# Patient Record
Sex: FEMALE | Race: WHITE
[De-identification: names, ages, dates, MRNs, and addresses within clinical notes are randomized per-mention and may not be internally consistent; named-entity substitution may affect disease eponyms.]

---

## 2017-12-09 ENCOUNTER — HOSPITAL ENCOUNTER (EMERGENCY)
Dept: HOSPITAL 46 - ED | Age: 67
Discharge: HOME | End: 2017-12-09
Payer: MEDICARE

## 2017-12-09 VITALS — BODY MASS INDEX: 24.99 KG/M2 | HEIGHT: 65 IN | WEIGHT: 150 LBS
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
## 2017-12-09 DIAGNOSIS — Z88.0: ICD-10-CM

## 2017-12-09 DIAGNOSIS — S01.01XA: Primary | ICD-10-CM

## 2017-12-09 DIAGNOSIS — Z90.710: ICD-10-CM

## 2017-12-09 DIAGNOSIS — Z23: ICD-10-CM

## 2017-12-09 DIAGNOSIS — Z79.899: ICD-10-CM

## 2017-12-09 PROCEDURE — 0HQ0XZZ REPAIR SCALP SKIN, EXTERNAL APPROACH: ICD-10-PCS

## 2018-08-13 ENCOUNTER — HOSPITAL ENCOUNTER (EMERGENCY)
Dept: HOSPITAL 46 - ED | Age: 68
Discharge: HOME | End: 2018-08-13
Payer: MEDICARE

## 2018-08-13 VITALS — HEIGHT: 65 IN | BODY MASS INDEX: 26.66 KG/M2 | WEIGHT: 159.99 LBS

## 2018-08-13 DIAGNOSIS — R51: ICD-10-CM

## 2018-08-13 DIAGNOSIS — Z88.0: ICD-10-CM

## 2018-08-13 DIAGNOSIS — R10.9: Primary | ICD-10-CM

## 2018-08-13 DIAGNOSIS — R11.2: ICD-10-CM

## 2018-08-13 DIAGNOSIS — Z79.899: ICD-10-CM

## 2018-10-01 ENCOUNTER — APPOINTMENT (RX ONLY)
Dept: URBAN - METROPOLITAN AREA CLINIC 34 | Facility: CLINIC | Age: 68
Setting detail: DERMATOLOGY
End: 2018-10-01

## 2018-10-01 VITALS
WEIGHT: 168 LBS | SYSTOLIC BLOOD PRESSURE: 119 MMHG | HEIGHT: 65 IN | HEART RATE: 64 BPM | DIASTOLIC BLOOD PRESSURE: 51 MMHG

## 2018-10-01 DIAGNOSIS — L64.8 OTHER ANDROGENIC ALOPECIA: ICD-10-CM

## 2018-10-01 PROCEDURE — ? VENIPUNCTURE

## 2018-10-01 PROCEDURE — ? COUNSELING

## 2018-10-01 PROCEDURE — ? PLAN FOR BMI MANAGEMENT

## 2018-10-01 PROCEDURE — ? ORDER TESTS

## 2018-10-01 PROCEDURE — 99213 OFFICE O/P EST LOW 20 MIN: CPT | Mod: 25

## 2018-10-01 PROCEDURE — 36415 COLL VENOUS BLD VENIPUNCTURE: CPT

## 2018-10-01 ASSESSMENT — LOCATION DETAILED DESCRIPTION DERM
LOCATION DETAILED: MID-OCCIPITAL SCALP
LOCATION DETAILED: RIGHT CENTRAL FRONTAL SCALP
LOCATION DETAILED: LEFT LATERAL FOREHEAD

## 2018-10-01 ASSESSMENT — LOCATION SIMPLE DESCRIPTION DERM
LOCATION SIMPLE: SCALP
LOCATION SIMPLE: LEFT FOREHEAD
LOCATION SIMPLE: POSTERIOR SCALP

## 2018-10-01 ASSESSMENT — LOCATION ZONE DERM
LOCATION ZONE: SCALP
LOCATION ZONE: FACE

## 2018-10-01 NOTE — PROCEDURE: VENIPUNCTURE
Venipuncture Paragraph: An alcohol pad was applied to the venipuncture site. Venipuncture was performed with a needle. Pressure and a bandaid was applied to the site. No complications were noted.
Bill For Individual Tests Below?: no
Detail Level: Detailed
Number Of Tubes Drawn: 1

## 2018-10-01 NOTE — PROCEDURE: COUNSELING
Detail Level: Detailed
Patient Specific Counseling (Will Not Stick From Patient To Patient): She has a hard time with the use of the minoxidil. The hair becomes to oily and matts the hair down. She is interested in PRP of the scalp. A handout was given to her and Tyshawn talked to her about starting treatment.\\n\\nShe will need her Basic metabolic panel drawn today and if all labs are stable I will refill the prescription for spironolactone.

## 2018-10-04 ENCOUNTER — RX ONLY (OUTPATIENT)
Age: 68
Setting detail: RX ONLY
End: 2018-10-04

## 2018-10-04 RX ORDER — SPIRONOLACTONE 100 MG/1
1 TABLET, FILM COATED ORAL DAILY
Qty: 30 | Refills: 11 | Status: ERX

## 2018-10-08 ENCOUNTER — APPOINTMENT (RX ONLY)
Dept: URBAN - METROPOLITAN AREA CLINIC 34 | Facility: CLINIC | Age: 68
Setting detail: DERMATOLOGY
End: 2018-10-08

## 2018-10-08 DIAGNOSIS — L65.9 NONSCARRING HAIR LOSS, UNSPECIFIED: ICD-10-CM

## 2018-10-08 DIAGNOSIS — Z41.9 ENCOUNTER FOR PROCEDURE FOR PURPOSES OTHER THAN REMEDYING HEALTH STATE, UNSPECIFIED: ICD-10-CM

## 2018-10-08 PROCEDURE — ? OTHER (COSMETIC)

## 2018-10-08 PROCEDURE — ? COSMETIC CONSULTATION: SKIN PEN

## 2018-10-08 PROCEDURE — ? ADDITIONAL NOTES

## 2018-10-08 ASSESSMENT — LOCATION DETAILED DESCRIPTION DERM
LOCATION DETAILED: INFERIOR MID FOREHEAD
LOCATION DETAILED: LEFT CENTRAL MALAR CHEEK
LOCATION DETAILED: MEDIAL FRONTAL SCALP

## 2018-10-08 ASSESSMENT — LOCATION SIMPLE DESCRIPTION DERM
LOCATION SIMPLE: INFERIOR FOREHEAD
LOCATION SIMPLE: FRONTAL SCALP
LOCATION SIMPLE: LEFT CHEEK

## 2018-10-08 ASSESSMENT — LOCATION ZONE DERM
LOCATION ZONE: SCALP
LOCATION ZONE: FACE

## 2018-10-16 ENCOUNTER — APPOINTMENT (RX ONLY)
Dept: URBAN - METROPOLITAN AREA CLINIC 34 | Facility: CLINIC | Age: 68
Setting detail: DERMATOLOGY
End: 2018-10-16

## 2018-10-16 DIAGNOSIS — L65.9 NONSCARRING HAIR LOSS, UNSPECIFIED: ICD-10-CM

## 2018-10-16 PROCEDURE — ? COUNSELING

## 2018-10-16 NOTE — HPI: HAIR LOSS
Previous Labs: Yes
How Did The Hair Loss Occur?: sudden in onset
How Severe Is Your Hair Loss?: severe
What Hair Products Do You Use?: Red[place]

## 2018-10-31 ENCOUNTER — APPOINTMENT (RX ONLY)
Dept: URBAN - METROPOLITAN AREA CLINIC 34 | Facility: CLINIC | Age: 68
Setting detail: DERMATOLOGY
End: 2018-10-31

## 2018-10-31 DIAGNOSIS — L65.9 NONSCARRING HAIR LOSS, UNSPECIFIED: ICD-10-CM

## 2018-10-31 DIAGNOSIS — L57.8 OTHER SKIN CHANGES DUE TO CHRONIC EXPOSURE TO NONIONIZING RADIATION: ICD-10-CM

## 2018-10-31 PROCEDURE — ? ADDITIONAL NOTES

## 2018-10-31 PROCEDURE — ? PLATELET RICH PLASMA INJECTION

## 2018-10-31 PROCEDURE — ? SKINPEN

## 2018-10-31 ASSESSMENT — LOCATION DETAILED DESCRIPTION DERM
LOCATION DETAILED: RIGHT SUPERIOR CENTRAL MALAR CHEEK
LOCATION DETAILED: RIGHT LOWER CUTANEOUS LIP
LOCATION DETAILED: RIGHT SUPERIOR MEDIAL FOREHEAD
LOCATION DETAILED: NASAL DORSUM
LOCATION DETAILED: RIGHT MEDIAL FRONTAL SCALP
LOCATION DETAILED: RIGHT FOREHEAD
LOCATION DETAILED: LEFT INFERIOR TEMPLE
LOCATION DETAILED: RIGHT INFERIOR FOREHEAD
LOCATION DETAILED: LEFT CENTRAL PARIETAL SCALP
LOCATION DETAILED: SUPERIOR MID FOREHEAD
LOCATION DETAILED: RIGHT INFERIOR MEDIAL MALAR CHEEK
LOCATION DETAILED: RIGHT CENTRAL FRONTAL SCALP
LOCATION DETAILED: LEFT MEDIAL MALAR CHEEK
LOCATION DETAILED: RIGHT CENTRAL PARIETAL SCALP
LOCATION DETAILED: RIGHT CENTRAL MALAR CHEEK
LOCATION DETAILED: PHILTRUM
LOCATION DETAILED: LEFT CENTRAL MALAR CHEEK
LOCATION DETAILED: RIGHT INFERIOR TEMPLE
LOCATION DETAILED: RIGHT INFERIOR MEDIAL FOREHEAD
LOCATION DETAILED: LEFT CENTRAL EYEBROW
LOCATION DETAILED: GLABELLA
LOCATION DETAILED: RIGHT SUPERIOR FOREHEAD
LOCATION DETAILED: LEFT INFERIOR CENTRAL BUCCAL CHEEK
LOCATION DETAILED: SUPERIOR MID FOREHEAD
LOCATION DETAILED: LEFT SUPERIOR CENTRAL MALAR CHEEK
LOCATION DETAILED: RIGHT SUPERIOR PARIETAL SCALP
LOCATION DETAILED: LEFT MEDIAL FRONTAL SCALP
LOCATION DETAILED: LEFT SUPERIOR FOREHEAD
LOCATION DETAILED: LEFT SUPERIOR PARIETAL SCALP
LOCATION DETAILED: LEFT UPPER CUTANEOUS LIP
LOCATION DETAILED: RIGHT CENTRAL EYEBROW
LOCATION DETAILED: LEFT INFERIOR LATERAL FOREHEAD

## 2018-10-31 ASSESSMENT — LOCATION SIMPLE DESCRIPTION DERM
LOCATION SIMPLE: SUPERIOR FOREHEAD
LOCATION SIMPLE: NOSE
LOCATION SIMPLE: LEFT FOREHEAD
LOCATION SIMPLE: RIGHT FOREHEAD
LOCATION SIMPLE: RIGHT LIP
LOCATION SIMPLE: GLABELLA
LOCATION SIMPLE: RIGHT EYEBROW
LOCATION SIMPLE: LEFT LIP
LOCATION SIMPLE: SCALP
LOCATION SIMPLE: LEFT CHEEK
LOCATION SIMPLE: UPPER LIP
LOCATION SIMPLE: LEFT TEMPLE
LOCATION SIMPLE: RIGHT TEMPLE
LOCATION SIMPLE: RIGHT SCALP
LOCATION SIMPLE: LEFT SCALP
LOCATION SIMPLE: RIGHT CHEEK
LOCATION SIMPLE: RIGHT FOREHEAD
LOCATION SIMPLE: LEFT FOREHEAD
LOCATION SIMPLE: LEFT EYEBROW
LOCATION SIMPLE: SUPERIOR FOREHEAD

## 2018-10-31 ASSESSMENT — LOCATION ZONE DERM
LOCATION ZONE: NOSE
LOCATION ZONE: LIP
LOCATION ZONE: FACE
LOCATION ZONE: FACE
LOCATION ZONE: SCALP

## 2018-10-31 NOTE — PROCEDURE: PLATELET RICH PLASMA INJECTION
Depth In Mm (Will Not Render If 0): 0.2
Depth In Mm (Will Not Render If 0): 0
Consent: Written consent obtained, risks reviewed including but not limited to pain, scarring, infection and incomplete improvement.  Patient understands the procedure is cosmetic in nature and will require out of pocket payment.
Which Technique?: Default
Detail Level: Zone
Show Additional Techniques: Yes
Treatment Number (Optional): 1
Location #2: See diagraim for injection sites/7cc total injected
Post-Care Instructions: After the procedure, take precautions agains sun exposure. Do not apply sunscreen for 12 hours after the procedure. Do not apply make-up for 12 hours after the procedure. Avoid alcohol based toners for 10-14 days. After 2-3 days patients can return to their regular skin regimen.
Consent: Written consent obtained, risks reviewed including but not limited to pain, scarring, infection and no improvement.  Patient understands the procedure is cosmetic in nature and will require out of pocket payment.
Standard Default Technique For Making Prp: The PRP tubes were placed into the centrifuge and spun down for 10 minutes. Then using an 18 gauge needle, the PRP was withdrawn into 3cc syringes. A 27 gauge needle was used for injection.
Post-Care Instructions: After the procedure, Do not wash hair for 24 hours. No strenuous activity for 24 hours. Monitor for any signs of infection. If any concerns develop, please call the clinic immediately.
Price (Use Numbers Only, No Special Characters Or $): 9894
Location #1: Lot # [id number] Exp: 2020-04-03

## 2018-10-31 NOTE — PROCEDURE: SKINPEN
Depth In Mm: 0.75
Location #4: nose
Depth In Mm: 0.25
Location #3: Chin, cheeks, jawline
Location #2: Around eyes
Consent: Written consent obtained, risks reviewed including but not limited to pain, scarring, infection and incomplete improvement.  Patient understands the procedure is cosmetic in nature and will require out of pocket payment.
Location #1: Chris
Speed (Optional): Self Adjusting
Depth In Mm: 1.25
Serum (Optional): PRP
Treatment Number (Optional): 1
Detail Level: Zone
Depth In Mm: 0.55
Post-Care Instructions: After the procedure, take precautions agains sun exposure. Do not apply sunscreen for 12 hours after the procedure. Do not apply make-up for 12 hours after the procedure. Avoid alcohol based toners for 10-14 days. After 2-3 days patients can return to their regular skin regimen.  A sample of Calming Complex was given to help soothe and hydrated the skin.  Samples of Daja Cream SPF 30, Daja Cream ointment, gentle facial cleanser, and CerVe Moisturizer.
Price (Use Numbers Only, No Special Characters Or $): 1600

## 2018-11-28 ENCOUNTER — APPOINTMENT (RX ONLY)
Dept: URBAN - METROPOLITAN AREA CLINIC 34 | Facility: CLINIC | Age: 68
Setting detail: DERMATOLOGY
End: 2018-11-28

## 2018-11-28 DIAGNOSIS — Z41.9 ENCOUNTER FOR PROCEDURE FOR PURPOSES OTHER THAN REMEDYING HEALTH STATE, UNSPECIFIED: ICD-10-CM

## 2018-11-28 DIAGNOSIS — L65.9 NONSCARRING HAIR LOSS, UNSPECIFIED: ICD-10-CM

## 2018-11-28 PROCEDURE — 0232T NJX PLATELET PLASMA: CPT

## 2018-11-28 PROCEDURE — ? SKINPEN

## 2018-11-28 PROCEDURE — ? ADDITIONAL NOTES

## 2018-11-28 PROCEDURE — ? PLATELET RICH PLASMA INJECTION

## 2018-11-28 ASSESSMENT — LOCATION DETAILED DESCRIPTION DERM
LOCATION DETAILED: RIGHT CENTRAL BUCCAL CHEEK
LOCATION DETAILED: RIGHT CENTRAL FRONTAL SCALP
LOCATION DETAILED: LEFT FOREHEAD
LOCATION DETAILED: RIGHT SUPERIOR MEDIAL BUCCAL CHEEK
LOCATION DETAILED: NASAL DORSUM
LOCATION DETAILED: SUPERIOR MID FOREHEAD
LOCATION DETAILED: RIGHT SUPERIOR MEDIAL FOREHEAD
LOCATION DETAILED: RIGHT SUPERIOR OCCIPITAL SCALP
LOCATION DETAILED: RIGHT LATERAL INFERIOR EYELID
LOCATION DETAILED: LEFT PHILTRAL RIDGE
LOCATION DETAILED: LEFT SUPERIOR OCCIPITAL SCALP
LOCATION DETAILED: POSTERIOR MID-PARIETAL SCALP
LOCATION DETAILED: LEFT INFERIOR CENTRAL MALAR CHEEK
LOCATION DETAILED: RIGHT UPPER CUTANEOUS LIP
LOCATION DETAILED: RIGHT MEDIAL FRONTAL SCALP
LOCATION DETAILED: RIGHT CENTRAL EYEBROW
LOCATION DETAILED: LEFT CENTRAL FRONTAL SCALP
LOCATION DETAILED: LEFT MEDIAL FRONTAL SCALP
LOCATION DETAILED: RIGHT FOREHEAD
LOCATION DETAILED: LEFT SUPERIOR PARIETAL SCALP
LOCATION DETAILED: INFERIOR MID FOREHEAD
LOCATION DETAILED: RIGHT INFERIOR CENTRAL MALAR CHEEK
LOCATION DETAILED: LEFT SUPERIOR CENTRAL MALAR CHEEK
LOCATION DETAILED: LEFT CENTRAL MALAR CHEEK
LOCATION DETAILED: RIGHT SUPERIOR PARIETAL SCALP
LOCATION DETAILED: RIGHT CENTRAL PARIETAL SCALP
LOCATION DETAILED: LEFT LOWER CUTANEOUS LIP
LOCATION DETAILED: RIGHT SUPERIOR FOREHEAD
LOCATION DETAILED: LEFT NASOLABIAL FOLD
LOCATION DETAILED: RIGHT CHIN
LOCATION DETAILED: LEFT CENTRAL EYEBROW
LOCATION DETAILED: LEFT CENTRAL PARIETAL SCALP

## 2018-11-28 ASSESSMENT — LOCATION SIMPLE DESCRIPTION DERM
LOCATION SIMPLE: LEFT LIP
LOCATION SIMPLE: LEFT SCALP
LOCATION SIMPLE: SCALP
LOCATION SIMPLE: INFERIOR FOREHEAD
LOCATION SIMPLE: RIGHT LIP
LOCATION SIMPLE: RIGHT EYEBROW
LOCATION SIMPLE: LEFT FOREHEAD
LOCATION SIMPLE: CHIN
LOCATION SIMPLE: LEFT CHEEK
LOCATION SIMPLE: RIGHT FOREHEAD
LOCATION SIMPLE: LEFT OCCIPITAL SCALP
LOCATION SIMPLE: NOSE
LOCATION SIMPLE: SUPERIOR FOREHEAD
LOCATION SIMPLE: RIGHT INFERIOR EYELID
LOCATION SIMPLE: LEFT EYEBROW
LOCATION SIMPLE: RIGHT SCALP
LOCATION SIMPLE: POSTERIOR SCALP
LOCATION SIMPLE: RIGHT FOREHEAD
LOCATION SIMPLE: RIGHT CHEEK
LOCATION SIMPLE: RIGHT OCCIPITAL SCALP

## 2018-11-28 ASSESSMENT — LOCATION ZONE DERM
LOCATION ZONE: LIP
LOCATION ZONE: FACE
LOCATION ZONE: FACE
LOCATION ZONE: EYELID
LOCATION ZONE: NOSE
LOCATION ZONE: SCALP

## 2018-11-28 NOTE — PROCEDURE: SKINPEN
Speed (Optional): Self Adjusting
Post-Care Instructions: We reviewed post-care directions:\\n*Immediate pinpoint bleeding can occur, which should resolve within 24 hours\\n*A sunburn-like effect is normal for 1-3 days. The skin may feel tight, dry, swollen and sensitive to touch. The treated area may appear darker and the darkened skin may flake off within 1 week. Avoid picking or exfoliating the area and allow old skin to flake off naturally.\\n*Sun exposure must be avoided for at least 24 hours after your treatment, preferably 1-2 weeks.  We recommend a protective hat and a full spectrum sun block of SPF 30 or higher. \\n*Wash the treated area gently twice a day with a gentle . Use tepid water only. Apply  a soothing, healing moisturizer as often as needed for the first 3 days.\\n*Discontinue use of any Alpha-Hydroxy products, Retinol, or Vitamin C for 5 days after treatment.\\n You may resume your regimen when skin is no longer flaking and peeling.\\n*Do not go swimming for at least 24 hours post-treatment\\n*Limit exercise the first week to walking\\n*Drink plenty of water\\n*Sleep on your back with your head elevated slightly to reduce swelling. \\nAfter the procedure, take precautions agains sun exposure. Do not apply sunscreen for 12 hours after the procedure. Do not apply make-up for 12 hours after the procedure. Avoid alcohol based toners for 10-14 days. After 2-3 days patients can return to their regular skin regimen.  Sample of Skin Pen Calming Complex were given to help soothe the skin.  A broad brimmed hat was given to the patient before leaving the building.  \\nSamples of Skin Pen Calming complex were given to help soothe and hydrate.
Depth In Mm: 0.75
Depth In Mm: 0.1
Location #1: forehead
Location #4: Cheeks and jawline
Depth In Mm: 1.35
Detail Level: Zone
Consent: Written consent obtained, risks reviewed including but not limited to pain, scarring, infection and incomplete improvement.  Patient understands the procedure is cosmetic in nature and will require out of pocket payment.
Serum (Optional): PRP
Treatment Number (Optional): 2
Location #2: Around lip,eyes and nose areas
Location #3: Chin

## 2018-11-28 NOTE — HPI: COSMETIC (MICRONEEDLING)
Have You Had Microneedling Treatments Before?: has had a previous microneedling treatment
When Was Your Last Treatment?: 10/31/2018

## 2018-11-28 NOTE — PROCEDURE: ADDITIONAL NOTES
Detail Level: Simple
Additional Notes: Skin Pen: Lot: [id number]\\nPRP: Lot #[id number] Lot # 2020-06-14\\n\\n\\nPatient states she is dark under her eye area and thinks it is a result of Latisse use.  Patient inquired if there is any other eye product beside Elastiderm to help with the darkness under the eye area.  Recommended Professional C eye brightener serum.  Discussed with patient how to use a tissue to protect the under eye area when applying Latisse to ensure not getting on the skin under the eye area.  Patient decided to purchase eye brightener at today’s visit.

## 2018-11-28 NOTE — PROCEDURE: PLATELET RICH PLASMA INJECTION
Amount Injected At This Location In Cc (Will Not Render If 0): 0.2
Consent: Written consent obtained, risks reviewed including but not limited to pain, scarring, infection and incomplete improvement.  Patient understands the procedure is cosmetic in nature and will require out of pocket payment.
Location #2: Lot # [id number] Lot # 2020-06-14
Standard Default Technique For Making Prp: The PRP tubes were placed into the centrifuge and spun down for 10 minutes. Then using an 18 gauge needle, the PRP was withdrawn into 3cc syringes. A 27 gauge needle was used for injection.
Treatment Number (Optional): 2
Detail Level: Zone
Which Technique?: Default
Show Additional Techniques: Yes
Medical Necessity Statement: Cosmetic
Consent: Written consent obtained, risks reviewed including but not limited to pain, scarring, infection and no improvement.  Patient understands the procedure is cosmetic in nature and will require out of pocket payment.
Location #1: See diagram for treatment sites/9cc prp used
Post-Care Instructions: No strenuous activity for 24 hours. Keep scalp dry for 24 hours. Monitor for sign and symptoms of infection. Use tylenol or ibuprofen for pain.
Post-Care Instructions: After the procedure, Do not wash hair for 24 hours. No strenuous activity for 24 hours. Monitor for any signs of infection. If any concerns develop, please call the clinic immediately.
Technique For Making Prp: The plasma was placed into the centrifuge and spun down. The tubes were shaken 10x. The PRP was removed with 3 cc syringes using 18 ga needles. The 18 ga needles were replace with 27 ga needles for injection.
Price (Use Numbers Only, No Special Characters Or $): 0.0

## 2019-01-02 ENCOUNTER — APPOINTMENT (RX ONLY)
Dept: URBAN - METROPOLITAN AREA CLINIC 34 | Facility: CLINIC | Age: 69
Setting detail: DERMATOLOGY
End: 2019-01-02

## 2019-01-02 DIAGNOSIS — L65.9 NONSCARRING HAIR LOSS, UNSPECIFIED: ICD-10-CM

## 2019-01-02 PROCEDURE — ? PLATELET RICH PLASMA INJECTION

## 2019-01-02 ASSESSMENT — LOCATION ZONE DERM
LOCATION ZONE: FACE
LOCATION ZONE: SCALP

## 2019-01-02 ASSESSMENT — LOCATION SIMPLE DESCRIPTION DERM
LOCATION SIMPLE: RIGHT OCCIPITAL SCALP
LOCATION SIMPLE: SCALP
LOCATION SIMPLE: LEFT SCALP
LOCATION SIMPLE: SUPERIOR FOREHEAD
LOCATION SIMPLE: RIGHT FOREHEAD
LOCATION SIMPLE: RIGHT SCALP
LOCATION SIMPLE: LEFT OCCIPITAL SCALP

## 2019-01-02 ASSESSMENT — LOCATION DETAILED DESCRIPTION DERM
LOCATION DETAILED: LEFT SUPERIOR OCCIPITAL SCALP
LOCATION DETAILED: LEFT MEDIAL FRONTAL SCALP
LOCATION DETAILED: RIGHT SUPERIOR OCCIPITAL SCALP
LOCATION DETAILED: RIGHT SUPERIOR FOREHEAD
LOCATION DETAILED: RIGHT CENTRAL FRONTAL SCALP
LOCATION DETAILED: RIGHT MEDIAL FRONTAL SCALP
LOCATION DETAILED: RIGHT SUPERIOR PARIETAL SCALP
LOCATION DETAILED: RIGHT SUPERIOR MEDIAL FOREHEAD
LOCATION DETAILED: RIGHT CENTRAL PARIETAL SCALP
LOCATION DETAILED: LEFT SUPERIOR PARIETAL SCALP
LOCATION DETAILED: SUPERIOR MID FOREHEAD

## 2019-01-02 NOTE — PROCEDURE: PLATELET RICH PLASMA INJECTION
Depth In Mm (Will Not Render If 0): 0.2
Price (Use Numbers Only, No Special Characters Or $): 7479
Which Technique?: Default
Consent: Written consent obtained, risks reviewed including but not limited to pain, scarring, infection and no improvement.  Patient understands the procedure is cosmetic in nature and will require out of pocket payment.
Post-Care Instructions: After the procedure, Do not wash hair for 24 hours. No strenuous activity for 24 hours. Monitor for any signs of infection. If any concerns develop, please call the clinic immediately.
Treatment Number (Optional): 3
Detail Level: Zone
Treatment Number (Optional): 0
Location #2: Lot# [id number] Exp 2020-06-14
Show Additional Techniques: Yes
Standard Default Technique For Making Prp: The PRP tubes were placed into the centrifuge and spun down for 10 minutes. Then using an 18 gauge needle, the PRP was withdrawn into 3cc syringes. A 27 gauge needle was used for injection.
Location #1: 7 cc injected/See diagram

## 2019-01-10 ENCOUNTER — APPOINTMENT (RX ONLY)
Dept: URBAN - METROPOLITAN AREA CLINIC 34 | Facility: CLINIC | Age: 69
Setting detail: DERMATOLOGY
End: 2019-01-10

## 2019-01-10 DIAGNOSIS — Z41.9 ENCOUNTER FOR PROCEDURE FOR PURPOSES OTHER THAN REMEDYING HEALTH STATE, UNSPECIFIED: ICD-10-CM

## 2019-01-10 PROCEDURE — ? SKINPEN

## 2019-01-10 PROCEDURE — ? ADDITIONAL NOTES

## 2019-01-10 ASSESSMENT — LOCATION DETAILED DESCRIPTION DERM
LOCATION DETAILED: RIGHT ULNAR DORSAL HAND
LOCATION DETAILED: RIGHT CENTRAL BUCCAL CHEEK
LOCATION DETAILED: RIGHT DORSAL INDEX FINGER METACARPOPHALANGEAL JOINT
LOCATION DETAILED: NASAL DORSUM
LOCATION DETAILED: SUBMENTAL CHIN
LOCATION DETAILED: INFERIOR MID FOREHEAD
LOCATION DETAILED: RIGHT INFERIOR CENTRAL MALAR CHEEK
LOCATION DETAILED: LEFT ULNAR DORSAL HAND
LOCATION DETAILED: GLABELLA
LOCATION DETAILED: RIGHT PROXIMAL DORSAL INDEX FINGER
LOCATION DETAILED: LEFT RADIAL DORSAL HAND
LOCATION DETAILED: LEFT PROXIMAL DORSAL INDEX FINGER
LOCATION DETAILED: LEFT CENTRAL MALAR CHEEK
LOCATION DETAILED: RIGHT FOREHEAD
LOCATION DETAILED: RIGHT CENTRAL EYEBROW
LOCATION DETAILED: RIGHT RADIAL DORSAL HAND
LOCATION DETAILED: LEFT CENTRAL BUCCAL CHEEK
LOCATION DETAILED: LEFT CENTRAL EYEBROW
LOCATION DETAILED: RIGHT CENTRAL MALAR CHEEK
LOCATION DETAILED: LEFT INFERIOR FOREHEAD
LOCATION DETAILED: 1ST WEB SPACE RIGHT HAND
LOCATION DETAILED: LEFT CHIN
LOCATION DETAILED: RIGHT INFERIOR MEDIAL FOREHEAD

## 2019-01-10 ASSESSMENT — LOCATION SIMPLE DESCRIPTION DERM
LOCATION SIMPLE: RIGHT FOREHEAD
LOCATION SIMPLE: RIGHT THUMB
LOCATION SIMPLE: RIGHT EYEBROW
LOCATION SIMPLE: LEFT FOREHEAD
LOCATION SIMPLE: GLABELLA
LOCATION SIMPLE: RIGHT CHEEK
LOCATION SIMPLE: INFERIOR FOREHEAD
LOCATION SIMPLE: LEFT HAND
LOCATION SIMPLE: LEFT CHEEK
LOCATION SIMPLE: SUBMENTAL CHIN
LOCATION SIMPLE: LEFT INDEX FINGER
LOCATION SIMPLE: RIGHT HAND
LOCATION SIMPLE: NOSE
LOCATION SIMPLE: LEFT EYEBROW
LOCATION SIMPLE: CHIN
LOCATION SIMPLE: RIGHT INDEX FINGER

## 2019-01-10 ASSESSMENT — LOCATION ZONE DERM
LOCATION ZONE: HAND
LOCATION ZONE: NOSE
LOCATION ZONE: FINGER
LOCATION ZONE: FACE

## 2019-01-10 NOTE — HPI: COSMETIC (MICRONEEDLING)
Have You Had Microneedling Treatments Before?: has had a previous microneedling treatment
When Was Your Last Treatment?: 12/28/2018

## 2019-01-10 NOTE — PROCEDURE: ADDITIONAL NOTES
Detail Level: Zone
Additional Notes: PRP:LOT [id number]  2020-08-29\\nSkin Pen: [id number] [phone]
Additional Notes: Patient added micro-Needling hands Treatment to face treatment.at today’s visit.  Additional charge of $200.00 per treatment for the hands.

## 2019-01-10 NOTE — PROCEDURE: SKINPEN
Serum (Optional): PRP
Post-Care Instructions: We reviewed post-care directions:\\n*Immediate pinpoint bleeding can occur, which should resolve within 24 hours\\n*A sunburn-like effect is normal for 1-3 days. The skin may feel tight, dry, swollen and sensitive to touch. The treated area may appear darker and the darkened skin may flake off within 1 week. Avoid picking or exfoliating the area and allow old skin to flake off naturally.\\n*Sun exposure must be avoided for at least 24 hours after your treatment, preferably 1-2 weeks.  We recommend a protective hat and a full spectrum sun block of SPF 30 or higher. \\n*Wash the treated area gently twice a day with a gentle . Use tepid water only. Apply  a soothing, healing moisturizer as often as needed for the first 3 days.\\n*Discontinue use of any Alpha-Hydroxy products, Retinol, or Vitamin C for 5 days after treatment.\\n You may resume your regimen when skin is no longer flaking and peeling.\\n*Do not go swimming for at least 24 hours post-treatment\\n*Limit exercise the first week to walking\\n*Drink plenty of water\\n*Sleep on your back with your head elevated slightly to reduce swelling. \\nAfter the procedure, take precautions agains sun exposure. Do not apply sunscreen for 12 hours after the procedure. Do not apply make-up for 12 hours after the procedure. Avoid alcohol based toners for 10-14 days. After 2-3 days patients can return to their regular skin regimen.  Sample of Skin Pen Calming Complex were given to help soothe the skin.  A broad brimmed hat was given to the patient before leaving the building.  \\nSamples of Skin Pen Calming complex were given to help soothe and hydrate.
Depth In Mm: 0.75
Consent: Written consent obtained, risks reviewed including but not limited to pain, scarring, infection and incomplete improvement.  Patient understands the procedure is cosmetic in nature and will require out of pocket payment.
Speed (Optional): Self Addjusting
Speed (Optional): Self Adjusting
Location #3: chin,cheeks and jawline
Price (Use Numbers Only, No Special Characters Or $): 200.00
Depth In Mm: 0.1
Detail Level: Zone
Depth In Mm: 1.5
Treatment Number (Optional): 3
Treatment Number (Optional): 1
Location #1: forehead
Location #1: hands
Depth In Mm: 1.25
Location #2: nose, around eye areas, upper lip

## 2019-01-30 ENCOUNTER — APPOINTMENT (RX ONLY)
Dept: URBAN - METROPOLITAN AREA CLINIC 34 | Facility: CLINIC | Age: 69
Setting detail: DERMATOLOGY
End: 2019-01-30

## 2019-01-30 DIAGNOSIS — L65.9 NONSCARRING HAIR LOSS, UNSPECIFIED: ICD-10-CM

## 2019-01-30 PROCEDURE — ? PLATELET RICH PLASMA INJECTION

## 2019-01-30 ASSESSMENT — LOCATION SIMPLE DESCRIPTION DERM
LOCATION SIMPLE: POSTERIOR SCALP
LOCATION SIMPLE: RIGHT SCALP
LOCATION SIMPLE: LEFT FOREHEAD
LOCATION SIMPLE: SCALP
LOCATION SIMPLE: RIGHT FOREHEAD
LOCATION SIMPLE: LEFT SCALP

## 2019-01-30 ASSESSMENT — LOCATION DETAILED DESCRIPTION DERM
LOCATION DETAILED: RIGHT SUPERIOR FOREHEAD
LOCATION DETAILED: POSTERIOR MID-PARIETAL SCALP
LOCATION DETAILED: RIGHT CENTRAL FRONTAL SCALP
LOCATION DETAILED: LEFT SUPERIOR PARIETAL SCALP
LOCATION DETAILED: LEFT CENTRAL FRONTAL SCALP
LOCATION DETAILED: RIGHT CENTRAL PARIETAL SCALP
LOCATION DETAILED: LEFT SUPERIOR MEDIAL FOREHEAD
LOCATION DETAILED: RIGHT SUPERIOR MEDIAL FOREHEAD
LOCATION DETAILED: RIGHT SUPERIOR PARIETAL SCALP
LOCATION DETAILED: RIGHT MEDIAL FRONTAL SCALP

## 2019-01-30 ASSESSMENT — LOCATION ZONE DERM
LOCATION ZONE: SCALP
LOCATION ZONE: FACE

## 2019-01-30 NOTE — PROCEDURE: PLATELET RICH PLASMA INJECTION
Depth In Mm (Will Not Render If 0): 0.2
Detail Level: Zone
Location #2: 7CC TOTAL USED/SEE DIAGRAM FOR LOCATION
Which Technique?: Default
Treatment Number (Optional): 0
Standard Default Technique For Making Prp: The PRP tubes were placed into the centrifuge and spun down for 10 minutes. Then using an 18 gauge needle, the PRP was withdrawn into 3cc syringes. A 27 gauge needle was used for injection.
Post-Care Instructions: After the procedure, Do not wash hair for 24 hours. No strenuous activity for 24 hours. Monitor for any signs of infection. If any concerns develop, please call the clinic immediately.
Consent: Written consent obtained, risks reviewed including but not limited to pain, scarring, infection and no improvement.  Patient understands the procedure is cosmetic in nature and will require out of pocket payment.
Treatment Number (Optional): 4
Show Additional Techniques: Yes
Location #1: Lot # [id number] Exp 2020-08-29

## 2019-01-30 NOTE — HPI: HAIR LOSS
Additional History: Patient is here for her 4th PRP Treatment.\\n\\nLot # [id number]\\nExp 2020-08-29

## 2019-04-09 ENCOUNTER — APPOINTMENT (RX ONLY)
Dept: URBAN - METROPOLITAN AREA CLINIC 34 | Facility: CLINIC | Age: 69
Setting detail: DERMATOLOGY
End: 2019-04-09

## 2019-04-09 DIAGNOSIS — Z41.9 ENCOUNTER FOR PROCEDURE FOR PURPOSES OTHER THAN REMEDYING HEALTH STATE, UNSPECIFIED: ICD-10-CM

## 2019-04-09 DIAGNOSIS — L57.8 OTHER SKIN CHANGES DUE TO CHRONIC EXPOSURE TO NONIONIZING RADIATION: ICD-10-CM

## 2019-04-09 PROBLEM — L20.84 INTRINSIC (ALLERGIC) ECZEMA: Status: ACTIVE | Noted: 2019-04-09

## 2019-04-09 PROCEDURE — ? ADDITIONAL NOTES

## 2019-04-09 PROCEDURE — ? OBAGI PRODUCT LINE COUNSELING

## 2019-04-09 PROCEDURE — ? OTHER (COSMETIC)

## 2019-04-09 PROCEDURE — ? SKINPEN

## 2019-04-09 ASSESSMENT — LOCATION DETAILED DESCRIPTION DERM
LOCATION DETAILED: NASAL SUPRATIP
LOCATION DETAILED: RIGHT CENTRAL MALAR CHEEK
LOCATION DETAILED: RIGHT MEDIAL FOREHEAD
LOCATION DETAILED: LEFT CHIN
LOCATION DETAILED: LEFT CENTRAL BUCCAL CHEEK
LOCATION DETAILED: RIGHT CENTRAL BUCCAL CHEEK
LOCATION DETAILED: LEFT CENTRAL MALAR CHEEK
LOCATION DETAILED: GLABELLA
LOCATION DETAILED: RIGHT CENTRAL EYEBROW
LOCATION DETAILED: PHILTRUM
LOCATION DETAILED: RIGHT UPPER CUTANEOUS LIP
LOCATION DETAILED: RIGHT FOREHEAD
LOCATION DETAILED: LEFT CENTRAL EYEBROW
LOCATION DETAILED: LEFT INFERIOR CENTRAL MALAR CHEEK
LOCATION DETAILED: RIGHT INFERIOR CENTRAL MALAR CHEEK

## 2019-04-09 ASSESSMENT — LOCATION SIMPLE DESCRIPTION DERM
LOCATION SIMPLE: RIGHT CHEEK
LOCATION SIMPLE: RIGHT EYEBROW
LOCATION SIMPLE: NOSE
LOCATION SIMPLE: CHIN
LOCATION SIMPLE: UPPER LIP
LOCATION SIMPLE: GLABELLA
LOCATION SIMPLE: LEFT EYEBROW
LOCATION SIMPLE: RIGHT FOREHEAD
LOCATION SIMPLE: RIGHT LIP
LOCATION SIMPLE: LEFT CHEEK

## 2019-04-09 ASSESSMENT — LOCATION ZONE DERM
LOCATION ZONE: LIP
LOCATION ZONE: NOSE
LOCATION ZONE: FACE

## 2019-04-09 NOTE — PROCEDURE: SKINPEN
Depth In Mm: 0.75
Speed (Optional): Self Adjusting
Location #3: upper lip
Location #4: chin, cheeks,and jawline
Depth In Mm: 1.05
Detail Level: Zone
Serum (Optional): PRP
Location #2: nose, around eyes
Depth In Mm: 0.1
Consent: Written consent obtained, risks reviewed including but not limited to pain, scarring, infection and incomplete improvement.  Patient understands the procedure is cosmetic in nature and will require out of pocket payment.
Location #1: Forehead
Depth In Mm: 2.05
Post-Care Instructions: After the procedure, take precautions agains sun exposure. Do not apply sunscreen or make-up for 12 hours after the procedure. Do not apply make-up for 12 hours after the procedure. Avoid alcohol based toners for 10-14 days. After 2-3 days patients can return to their regular skin regimen.  \\n*Immediate pinpoint bleeding can occur, which should resolve within 24 hours.\\n*A sunburn-lie effect is normal for 1-3 days. The skin may feel tight, dry, swollen and sensitive to touch.  The treated area may appear darker and the darkened skin may flake off within 1 wee.  Avoid picking or exfoliating the area and allow old skin to flake off naturally.\\n*Sun exposure must be avoided for a least 24 hours after your treatment, preferably 1-2 weeks.  We recommend a protective hat and a full spectrum sunblock of SPF 30 or higher.\\n*Wash the treated area gently twice a day with a gentle cleanser.  Use tepid water only.  Apply a soothing, healing moisturizer as often as needed for the first 3 days.\\n*Discontinue use of any Alpha-Hydroxy products, Retinol, or lVitamin C for 5 days after your treatment.  You may resume your regimen when skin is no longer flaking and peeling.\\n*Do not go swimming for at least 24 hours post-treatment.\\n*Do not apply your regular make-up and SPF for a minimum of 24 hours after your treatment.\\n*Limit exercise the first week to walking.\\n*Drink plenty of water.\\n*Sleep on your back with your head elevated slightly to reduce swelling.\\n\\nDiscontinue use of any Alpha-Hydroxy products, Retinol, or Vitamin C for 5 days after treatment.  You may resume your regimen when skin is no longer flaking and peeling.  Do not go swimming for at least 24 hours post-treatment.  Do not apply your regular make-up and SPF for a minimum of 24 hours after your treatment.  Limit exercise the first week to walking.  Drink plenty of water.  Sleep on your back with your head elevated slightly to reduce swelling.  After 2-3 days patients can return to their regular skin regimen.
Depth In Mm: 1.5
Treatment Number (Optional): 4

## 2019-04-09 NOTE — HPI: COSMETIC (MICRONEEDLING)
Have You Had Microneedling Treatments Before?: has had a previous microneedling treatment
When Was Your Last Treatment?: 01/10/19

## 2019-04-09 NOTE — PROCEDURE: ADDITIONAL NOTES
Additional Notes: Skin Pen: Ref: 014 Lot [id number].   PRP tube: Lot [id number] 2020-20-18
Detail Level: Zone
Additional Notes: After consulting patient was interested in starting on Tretinion, and Xeomin injections to follow todays visit.   SIRI Welsh was consulted for distribution.  Tyshawn stepped in to consult with patient and recommend patient come in to see him on a separate visit for a face skin check as he wants to give patient samples of another RetinA product  and he advised he will do Xeomin injection on same visit.  Patient inquired on recommendation for more hydration.  Suggested Obagi Hydrate.  Applied small amount of Hydrate to patient's hands to sample.  Patient decided to purchase Hydrate at today's visit.

## 2019-04-24 ENCOUNTER — APPOINTMENT (RX ONLY)
Dept: URBAN - METROPOLITAN AREA CLINIC 34 | Facility: CLINIC | Age: 69
Setting detail: DERMATOLOGY
End: 2019-04-24

## 2019-04-24 DIAGNOSIS — L57.8 OTHER SKIN CHANGES DUE TO CHRONIC EXPOSURE TO NONIONIZING RADIATION: ICD-10-CM

## 2019-04-24 DIAGNOSIS — Z41.9 ENCOUNTER FOR PROCEDURE FOR PURPOSES OTHER THAN REMEDYING HEALTH STATE, UNSPECIFIED: ICD-10-CM

## 2019-04-24 PROCEDURE — ? XEOMIN

## 2019-04-24 PROCEDURE — ? COUNSELING

## 2019-04-24 PROCEDURE — ? PRESCRIPTION

## 2019-04-24 RX ORDER — TRETINOIN 0.5 MG/G
PEA SIZED AMOUNT LOTION TOPICAL QHS
Qty: 1 | Refills: 2 | Status: ERX | COMMUNITY
Start: 2019-04-24

## 2019-04-24 RX ADMIN — TRETINOIN PEA SIZED AMOUNT: 0.5 LOTION TOPICAL at 00:00

## 2019-04-24 ASSESSMENT — LOCATION DETAILED DESCRIPTION DERM
LOCATION DETAILED: RIGHT INFERIOR TEMPLE
LOCATION DETAILED: RIGHT MEDIAL FOREHEAD
LOCATION DETAILED: GLABELLA
LOCATION DETAILED: RIGHT SUPERIOR LATERAL MALAR CHEEK
LOCATION DETAILED: LEFT FOREHEAD
LOCATION DETAILED: RIGHT FOREHEAD
LOCATION DETAILED: INFERIOR MID FOREHEAD
LOCATION DETAILED: RIGHT LATERAL EYEBROW
LOCATION DETAILED: LEFT MEDIAL FOREHEAD
LOCATION DETAILED: LEFT MEDIAL EYEBROW
LOCATION DETAILED: RIGHT MID TEMPLE
LOCATION DETAILED: RIGHT MEDIAL EYEBROW
LOCATION DETAILED: LEFT LATERAL EYEBROW
LOCATION DETAILED: LEFT INFERIOR TEMPLE
LOCATION DETAILED: LEFT SUPERIOR LATERAL MALAR CHEEK

## 2019-04-24 ASSESSMENT — LOCATION SIMPLE DESCRIPTION DERM
LOCATION SIMPLE: RIGHT FOREHEAD
LOCATION SIMPLE: INFERIOR FOREHEAD
LOCATION SIMPLE: LEFT TEMPLE
LOCATION SIMPLE: LEFT FOREHEAD
LOCATION SIMPLE: LEFT CHEEK
LOCATION SIMPLE: LEFT EYEBROW
LOCATION SIMPLE: RIGHT TEMPLE
LOCATION SIMPLE: RIGHT CHEEK
LOCATION SIMPLE: GLABELLA
LOCATION SIMPLE: RIGHT EYEBROW

## 2019-04-24 ASSESSMENT — LOCATION ZONE DERM: LOCATION ZONE: FACE

## 2019-04-24 NOTE — PROCEDURE: XEOMIN
Additional Area 1 Location: FRONTALIS
Price (Use Numbers Only, No Special Characters Or $): $10/U x 50u: $500
Additional Area 5 Units: 0
Additional Area 6 Location: upper lip
Additional Area 2 Location: GLABELLA
Consent: Written consent obtained. Risks include but not limited to lid/brow ptosis, bruising, swelling, diplopia, temporary effect, incomplete chemical denervation.
Expiration Date (Month Year): 12/20
Additional Area 4 Units: 5
Additional Area 2 Units: 20
Additional Area 4 Location: BROWS
Additional Area 3 Units: 10
Additional Area 1 Units: 15
Lot #: [id number]
Additional Area 3 Location: CROWS FEET
Additional Area 5 Location: SHERRON'S
Post-Care Instructions: Patient instructed to not lie down for 4 hours and limit physical activity for 24 hours. Patient instructed not to travel by airplane for 48 hours.
Detail Level: Zone

## 2019-05-07 ENCOUNTER — RX ONLY (OUTPATIENT)
Age: 69
Setting detail: RX ONLY
End: 2019-05-07

## 2019-05-07 RX ORDER — TRETINOIN 0.5 MG/G
PEA SIZED AMOUNT LOTION TOPICAL QD
Qty: 1 | Refills: 2 | Status: CANCELLED
Stop reason: CLARIF

## 2019-05-13 ENCOUNTER — APPOINTMENT (RX ONLY)
Dept: URBAN - METROPOLITAN AREA CLINIC 34 | Facility: CLINIC | Age: 69
Setting detail: DERMATOLOGY
End: 2019-05-13

## 2019-05-13 DIAGNOSIS — Z41.9 ENCOUNTER FOR PROCEDURE FOR PURPOSES OTHER THAN REMEDYING HEALTH STATE, UNSPECIFIED: ICD-10-CM

## 2019-05-13 PROCEDURE — ? OTHER

## 2019-05-13 NOTE — PROCEDURE: OTHER
Note Text (......Xxx Chief Complaint.): This diagnosis correlates with the
Other (Free Text): She is very happy with her result. No touch up needed
Detail Level: Zone

## 2019-06-19 ENCOUNTER — APPOINTMENT (RX ONLY)
Dept: URBAN - METROPOLITAN AREA CLINIC 34 | Facility: CLINIC | Age: 69
Setting detail: DERMATOLOGY
End: 2019-06-19

## 2019-06-19 DIAGNOSIS — Z41.9 ENCOUNTER FOR PROCEDURE FOR PURPOSES OTHER THAN REMEDYING HEALTH STATE, UNSPECIFIED: ICD-10-CM

## 2019-06-19 PROCEDURE — ? OTHER

## 2019-06-19 PROCEDURE — ? XEOMIN

## 2019-06-19 ASSESSMENT — LOCATION DETAILED DESCRIPTION DERM
LOCATION DETAILED: GLABELLA
LOCATION DETAILED: RIGHT MEDIAL FOREHEAD
LOCATION DETAILED: RIGHT FOREHEAD
LOCATION DETAILED: LEFT FOREHEAD

## 2019-06-19 ASSESSMENT — LOCATION ZONE DERM: LOCATION ZONE: FACE

## 2019-06-19 ASSESSMENT — LOCATION SIMPLE DESCRIPTION DERM
LOCATION SIMPLE: GLABELLA
LOCATION SIMPLE: LEFT FOREHEAD
LOCATION SIMPLE: RIGHT FOREHEAD

## 2019-06-19 NOTE — PROCEDURE: OTHER
Detail Level: Zone
Other (Free Text): I have discussed with the patient that her wrinkles are low on the forehead so there is some risks of dropping the brows. The patient understands and wishes to proceed
Note Text (......Xxx Chief Complaint.): This diagnosis correlates with the

## 2019-06-19 NOTE — PROCEDURE: XEOMIN
Levator Labii Superioris Units: 0
Additional Area 2 Location: GLABELLA
Additional Area 1 Location: FRONTALIS
Dilution (U/0.1 Cc): 5
Price (Use Numbers Only, No Special Characters Or $): $12/u x 12u= $144
Additional Area 4 Location: BROWS
Consent: Written consent obtained. Risks include but not limited to lid/brow ptosis, bruising, swelling, diplopia, temporary effect, incomplete chemical denervation.
Additional Area 1 Units: 2
Detail Level: Zone
Additional Area 3 Location: CROWS FEET
Additional Area 2 Units: 10
Additional Area 5 Location: SHERRON'S
Expiration Date (Month Year): 03/21
Additional Area 6 Location: upper lip
Post-Care Instructions: Patient instructed to not lie down for 4 hours and limit physical activity for 24 hours. Patient instructed not to travel by airplane for 48 hours.
Lot #: [id number]

## 2019-08-28 ENCOUNTER — APPOINTMENT (RX ONLY)
Dept: URBAN - METROPOLITAN AREA CLINIC 34 | Facility: CLINIC | Age: 69
Setting detail: DERMATOLOGY
End: 2019-08-28

## 2019-08-28 DIAGNOSIS — Z41.9 ENCOUNTER FOR PROCEDURE FOR PURPOSES OTHER THAN REMEDYING HEALTH STATE, UNSPECIFIED: ICD-10-CM

## 2019-08-28 PROCEDURE — ? XEOMIN

## 2019-08-28 ASSESSMENT — LOCATION SIMPLE DESCRIPTION DERM
LOCATION SIMPLE: LEFT EYEBROW
LOCATION SIMPLE: RIGHT FOREHEAD
LOCATION SIMPLE: LEFT FOREHEAD
LOCATION SIMPLE: GLABELLA
LOCATION SIMPLE: RIGHT EYEBROW

## 2019-08-28 ASSESSMENT — LOCATION ZONE DERM: LOCATION ZONE: FACE

## 2019-08-28 ASSESSMENT — LOCATION DETAILED DESCRIPTION DERM
LOCATION DETAILED: GLABELLA
LOCATION DETAILED: LEFT INFERIOR FOREHEAD
LOCATION DETAILED: LEFT LATERAL EYEBROW
LOCATION DETAILED: RIGHT INFERIOR LATERAL FOREHEAD
LOCATION DETAILED: LEFT INFERIOR MEDIAL FOREHEAD
LOCATION DETAILED: RIGHT LATERAL EYEBROW
LOCATION DETAILED: LEFT FOREHEAD
LOCATION DETAILED: LEFT MEDIAL EYEBROW
LOCATION DETAILED: RIGHT FOREHEAD
LOCATION DETAILED: LEFT INFERIOR LATERAL FOREHEAD
LOCATION DETAILED: RIGHT CENTRAL EYEBROW
LOCATION DETAILED: RIGHT MEDIAL FOREHEAD
LOCATION DETAILED: LEFT CENTRAL EYEBROW
LOCATION DETAILED: RIGHT MEDIAL EYEBROW

## 2019-08-28 NOTE — PROCEDURE: XEOMIN
[place] Units: 0
Additional Area 5 Location: SHERRON'S
Detail Level: Zone
Price (Use Numbers Only, No Special Characters Or $): no charge employee
Additional Area 6 Location: upper lip
Additional Area 4 Units: 4
Price (Use Numbers Only, No Special Characters Or $): 35u @ $12u= $420
Lot #: [id number]
Additional Area 2 Location: GLABELLA
Additional Area 3 Location: CROWS FEET
Dilution (U/0.1 Cc): 5
Additional Area 4 Location: BROWS
Consent: Written consent obtained. Risks include but not limited to lid/brow ptosis, bruising, swelling, diplopia, temporary effect, incomplete chemical denervation.
Additional Area 1 Location: FRONTALIS
Additional Area 2 Units: 20
Post-Care Instructions: Patient instructed to not lie down for 4 hours and limit physical activity for 24 hours. Patient instructed not to travel by airplane for 48 hours.
Expiration Date (Month Year): 9/21
Additional Area 1 Units: 11

## 2019-11-10 ENCOUNTER — RX ONLY (OUTPATIENT)
Age: 69
Setting detail: RX ONLY
End: 2019-11-10

## 2019-11-11 ENCOUNTER — APPOINTMENT (RX ONLY)
Dept: URBAN - METROPOLITAN AREA CLINIC 34 | Facility: CLINIC | Age: 69
Setting detail: DERMATOLOGY
End: 2019-11-11

## 2019-11-11 VITALS
WEIGHT: 168 LBS | HEIGHT: 65 IN | HEART RATE: 57 BPM | SYSTOLIC BLOOD PRESSURE: 99 MMHG | DIASTOLIC BLOOD PRESSURE: 55 MMHG

## 2019-11-11 DIAGNOSIS — L64.8 OTHER ANDROGENIC ALOPECIA: ICD-10-CM

## 2019-11-11 PROCEDURE — ? PLAN FOR BMI MANAGEMENT

## 2019-11-11 PROCEDURE — 99213 OFFICE O/P EST LOW 20 MIN: CPT

## 2019-11-11 PROCEDURE — ? COUNSELING

## 2019-11-11 ASSESSMENT — LOCATION DETAILED DESCRIPTION DERM
LOCATION DETAILED: RIGHT CENTRAL FRONTAL SCALP
LOCATION DETAILED: LEFT LATERAL FOREHEAD
LOCATION DETAILED: MID-OCCIPITAL SCALP

## 2019-11-11 ASSESSMENT — LOCATION SIMPLE DESCRIPTION DERM
LOCATION SIMPLE: SCALP
LOCATION SIMPLE: POSTERIOR SCALP
LOCATION SIMPLE: LEFT FOREHEAD

## 2019-11-11 ASSESSMENT — LOCATION ZONE DERM
LOCATION ZONE: SCALP
LOCATION ZONE: FACE

## 2019-11-11 NOTE — PROCEDURE: COUNSELING
Detail Level: Detailed
Patient Specific Counseling (Will Not Stick From Patient To Patient): She has new hair growth. I suggested she continue with the spironolactone. She has noted some dizziness. She has felt dizzy the last two weeks and she has been off the spironolactone. This may be related to hypoglycemia. I suggested she drop to 50 mg. She does not want to decrease the dose but she decided that she will do 100 mg alternation with 50 mg to see if the symptoms improve. \\nShe had blood drawn in March. We will request the results If her potassium is in acceptable range we will continue with the current dosage.

## 2019-11-13 ENCOUNTER — RX ONLY (OUTPATIENT)
Age: 69
Setting detail: RX ONLY
End: 2019-11-13

## 2020-04-21 NOTE — NUR
04/21/20 1129 Johana Lindsay 1105 PT ARRIVED IN PACU WIDE AWAKE WITH NO C/O'S. 1115 [id number] CXR
DONE. 1125 SITTING UP IN BED EATING ICE CHIPS. ANESTHESIA AT BEDSIDE.
1130 REPORT GIVEN TO CYRUS WOODARD.

## 2020-04-21 NOTE — OR
[facility]
                                    [address], [place]  [address]
_________________________________________________________________________________________
                                                                 Draft    
 
 
DATE OF OPERATION:
04/21/2020
 
SURGEON:
Sunny Barney MD
 
PREOPERATIVE DIAGNOSIS:
Chronic sinusitis, septal deformity.
 
POSTOPERATIVE DIAGNOSIS:
Chronic sinusitis, septal deformity.
 
PROCEDURE:
Septoplasty, bilateral intranasal ethmoidectomy.
 
ANESTHESIA:
General orotracheal; CRNA, Supriya.
 
PREOPERATIVE HISTORY:
Karin Doan is a 69-year-old lady with chronic sinus infections.  These have been
persistent, recurrent for about the past two or three months.  She has been on multiple
antibiotics, courses of steroids, continued symptomatology consisting mainly of cough.
CAT scan recently has shown bilateral pan-sinus opacification.  She is taken to the
operating room for the above-mentioned procedures. 
 
PROCEDURE AND FINDINGS:
After informed consent, the patient was taken to the operating room, placed in supine
position where general orotracheal anesthesia was induced.  The standard corona virus
precautions were taken.  Preop CT was viewed throughout.  The patient received
preoperative intranasal oxymetazoline, intravenous Ancef.  The headlight speculum exam
of the nasal cavity showed a septal deformity.  Left side obstructive, septal spurs were
taken down after 1% lidocaine with epi.  The septum was medialized to allow access to
the middle meatus on the left side. 
 
The left middle turbinate was medialized.  Immediate polypoid purulent material in the
middle meatus filling the middle meatus.  A Robbie was used to take down the anterior
ethmoid air cells, ethmoid bulla extending back posteriorly.  All sinuses in this area
were filled with polypoid material and purulence.  Nasofrontal duct was opened with a
curved Robbie and polypoid material removed from the duct.  The middle meatal
antrostomy was made with a curved ring curette widened with the Robbie.  This
maxillary sinus was filled with polypoid material, which was removed.  Adequate
antrostomy was made.  Minimal bleeding occurred.  Packing was placed on this side.  A
 
                                                                                    
_________________________________________________________________________________________
PATIENT NAME:     KARIN DOAN              
MEDICAL RECORD #: [id number]            OPERATIVE REPORT              
          ACCT #: [id number]  
DATE OF BIRTH:   [birth year]            REPORT #: [id number]      
PHYSICIAN:        SUNNY BARNEY MD              
PCP:              KYM PEOPLES MD                
REPORT IS CONFIDENTIAL AND NOT TO BE RELEASED WITHOUT AUTHORIZATION
 
 
                                  [facility]
                                    [address] [place] [address]  [id number]
_________________________________________________________________________________________
                                                                 Draft    
 
 
Walker pack coated with Neosporin in the middle meatus and a trimmed Merocel pack in the
nasal cavity.  Specimen was sent to pathology.  The same procedure essentially the same
findings on the right side.  Packing was placed.  Packing was tied anteriorly by strings
on pad.  Pharynx was suctioned clear of blood and secretions.  Hemostasis was verified.
The patient was then awakened, extubated, transported to the recovery room in good
condition.  No complications. 
 
BLOOD LOSS:
Around 250 mL.
 
SPECIMEN:
To pathology.
 
PACKING:
Two pieces of Merocel each nostril.
 
DRAINS:
None.
 
COMPLICATIONS:
None.
 
 
 
            ________________________________________
            Sunny Barney MD 
 
 
GC/MODL
Job #:  [id number]
DD:  04/21/2020 10:43:00
DT:  04/21/2020 15:28:59
 
 
Copies:                                
~
 
 
 
 
 
 
 
                                                                                    
_________________________________________________________________________________________
PATIENT NAME:     OLIMPIAKARIN VIKRAM              
MEDICAL RECORD #: [id number]            OPERATIVE REPORT              
          ACCT #: [id number]  
DATE OF BIRTH:   [birth year]            REPORT #: [id number]      
PHYSICIAN:        SUNNY BARNEY MD              
PCP:              KYM PEOPLES MD                
REPORT IS CONFIDENTIAL AND NOT TO BE RELEASED WITHOUT AUTHORIZATION

## 2020-04-21 NOTE — NUR
1135: REPORT RECEIVED FROM PACU. PATIENT IN NEGATIVE PRESSURE ROOM. PATIENT
C/O PAIN 5/10. VS CHECKED. IV SITE WNL. NASAL PACKING IN PLACE WITH SMALL
AMOUNT OF RED DRAINAGE SEEN. SCDs ON. PATIENT CONTINUES TO COUGH FREQUENTLY.
TOLERATING ICE CHIPS. GIVEN PUDDING TO EAT BEFORE MEDICATING FOR PAIN. CALL
LIGHT WITHIN REACH.
1155: PATIENT TOLERATED PUDDING. GIVEN ICE WATER. MEDICATED FOR PAIN WITH 2
TABS OF HYDROCODONE. MOUSTACHE DRESSING PLACED ON PATIENT TO CATCH DRAINAGE.
CALL LIGHT WITHIN REACH.

## 2020-04-21 NOTE — NUR
1215: DISCHARGE INSTRUCTIONS GIVEN TO PATIENT. PATIENT STATES SHE WANTS TO GO
HOME.
1230: VS CHECKED. IV SITE WNL. TOLERATING WATER. ASSISTED PATIENT OOB AND TO
GET DRESSED.
1245: IV DC'D WNL. TIP INTACT. DRESSING APPLIED. PATIENT DISCHARGED TO HOME
WITH  VIA WHEELCHAIR.

## 2020-11-12 ENCOUNTER — APPOINTMENT (RX ONLY)
Dept: URBAN - METROPOLITAN AREA CLINIC 34 | Facility: CLINIC | Age: 70
Setting detail: DERMATOLOGY
End: 2020-11-12

## 2020-11-12 VITALS
HEART RATE: 74 BPM | WEIGHT: 150 LBS | HEIGHT: 65 IN | SYSTOLIC BLOOD PRESSURE: 107 MMHG | DIASTOLIC BLOOD PRESSURE: 61 MMHG

## 2020-11-12 DIAGNOSIS — L24 IRRITANT CONTACT DERMATITIS: ICD-10-CM

## 2020-11-12 DIAGNOSIS — L29.89 OTHER PRURITUS: ICD-10-CM

## 2020-11-12 DIAGNOSIS — L29.8 OTHER PRURITUS: ICD-10-CM

## 2020-11-12 DIAGNOSIS — Z23 ENCOUNTER FOR IMMUNIZATION: ICD-10-CM

## 2020-11-12 PROBLEM — Z11.59 ENCOUNTER FOR SCREENING FOR OTHER VIRAL DISEASES: Status: ACTIVE | Noted: 2020-11-12

## 2020-11-12 PROBLEM — L24.9 IRRITANT CONTACT DERMATITIS, UNSPECIFIED CAUSE: Status: ACTIVE | Noted: 2020-11-12

## 2020-11-12 PROCEDURE — ? ADDITIONAL NOTES

## 2020-11-12 PROCEDURE — ? PRESCRIPTION

## 2020-11-12 PROCEDURE — ? COUNSELING

## 2020-11-12 PROCEDURE — ? PLAN FOR BMI MANAGEMENT

## 2020-11-12 PROCEDURE — 96372 THER/PROPH/DIAG INJ SC/IM: CPT

## 2020-11-12 PROCEDURE — 99214 OFFICE O/P EST MOD 30 MIN: CPT | Mod: 25

## 2020-11-12 PROCEDURE — ? INTRAMUSCULAR KENALOG

## 2020-11-12 RX ORDER — NALTREXONE HYDROCHLORIDE 50 MG/1
1 TABLET, FILM COATED ORAL DAILY
Qty: 30 | Refills: 0 | Status: ERX | COMMUNITY
Start: 2020-11-12

## 2020-11-12 RX ORDER — CLOBETASOL PROPIONATE 0.5 MG/ML
THIN LAYER SOLUTION TOPICAL BID
Qty: 1 | Refills: 1 | Status: ERX | COMMUNITY
Start: 2020-11-12

## 2020-11-12 RX ADMIN — CLOBETASOL PROPIONATE THIN LAYER: 0.5 SOLUTION TOPICAL at 00:00

## 2020-11-12 RX ADMIN — NALTREXONE HYDROCHLORIDE 1: 50 TABLET, FILM COATED ORAL at 00:00

## 2020-11-12 ASSESSMENT — LOCATION DETAILED DESCRIPTION DERM
LOCATION DETAILED: RIGHT RIB CAGE
LOCATION DETAILED: LEFT ANTERIOR DISTAL THIGH
LOCATION DETAILED: LEFT SUPERIOR UPPER BACK
LOCATION DETAILED: LEFT LATERAL BREAST 12-1:00 REGION
LOCATION DETAILED: LEFT ANTERIOR PROXIMAL UPPER ARM
LOCATION DETAILED: RIGHT VENTRAL DISTAL FOREARM
LOCATION DETAILED: LEFT LATERAL ABDOMEN
LOCATION DETAILED: LEFT PROXIMAL DORSAL FOREARM
LOCATION DETAILED: RIGHT PROXIMAL CALF
LOCATION DETAILED: LEFT PROXIMAL POSTERIOR UPPER ARM
LOCATION DETAILED: RIGHT MID-UPPER BACK
LOCATION DETAILED: RIGHT SUPERIOR LATERAL MIDBACK
LOCATION DETAILED: RIGHT PROXIMAL PRETIBIAL REGION
LOCATION DETAILED: LEFT RIB CAGE
LOCATION DETAILED: LEFT BUTTOCK
LOCATION DETAILED: LEFT DISTAL DORSAL FOREARM
LOCATION DETAILED: RIGHT SUPERIOR MEDIAL UPPER BACK
LOCATION DETAILED: LEFT SUPERIOR MEDIAL MIDBACK
LOCATION DETAILED: RIGHT ANTERIOR PROXIMAL UPPER ARM
LOCATION DETAILED: RIGHT INFERIOR LATERAL MIDBACK
LOCATION DETAILED: RIGHT LATERAL ABDOMEN
LOCATION DETAILED: RIGHT ANTERIOR DISTAL THIGH
LOCATION DETAILED: RIGHT POPLITEAL SKIN
LOCATION DETAILED: RIGHT DISTAL POSTERIOR THIGH
LOCATION DETAILED: INFERIOR LUMBAR SPINE
LOCATION DETAILED: EPIGASTRIC SKIN
LOCATION DETAILED: RIGHT ANTERIOR PROXIMAL THIGH
LOCATION DETAILED: LEFT MID-UPPER BACK
LOCATION DETAILED: LEFT ANTERIOR PROXIMAL THIGH
LOCATION DETAILED: RIGHT PROXIMAL DORSAL FOREARM
LOCATION DETAILED: RIGHT PROXIMAL POSTERIOR UPPER ARM
LOCATION DETAILED: LEFT INFERIOR LATERAL MIDBACK
LOCATION DETAILED: LEFT VENTRAL DISTAL FOREARM
LOCATION DETAILED: RIGHT MEDIAL BREAST 1-2:00 REGION
LOCATION DETAILED: LEFT PROXIMAL CALF
LOCATION DETAILED: RIGHT SUPERIOR LATERAL UPPER BACK
LOCATION DETAILED: RIGHT DISTAL PRETIBIAL REGION
LOCATION DETAILED: LEFT DISTAL POSTERIOR THIGH
LOCATION DETAILED: LEFT POPLITEAL SKIN
LOCATION DETAILED: LEFT PROXIMAL PRETIBIAL REGION
LOCATION DETAILED: RIGHT PROXIMAL RADIAL DORSAL FOREARM
LOCATION DETAILED: LEFT DISTAL PRETIBIAL REGION

## 2020-11-12 ASSESSMENT — LOCATION SIMPLE DESCRIPTION DERM
LOCATION SIMPLE: LEFT CALF
LOCATION SIMPLE: LEFT THIGH
LOCATION SIMPLE: LEFT PRETIBIAL REGION
LOCATION SIMPLE: LEFT UPPER ARM
LOCATION SIMPLE: RIGHT POSTERIOR UPPER ARM
LOCATION SIMPLE: LEFT POSTERIOR UPPER ARM
LOCATION SIMPLE: RIGHT UPPER ARM
LOCATION SIMPLE: RIGHT POPLITEAL SKIN
LOCATION SIMPLE: LEFT POPLITEAL SKIN
LOCATION SIMPLE: LOWER BACK
LOCATION SIMPLE: ABDOMEN
LOCATION SIMPLE: RIGHT LOWER BACK
LOCATION SIMPLE: LEFT FOREARM
LOCATION SIMPLE: RIGHT POSTERIOR THIGH
LOCATION SIMPLE: LEFT LOWER BACK
LOCATION SIMPLE: RIGHT UPPER BACK
LOCATION SIMPLE: RIGHT BACK
LOCATION SIMPLE: LEFT POSTERIOR THIGH
LOCATION SIMPLE: RIGHT CALF
LOCATION SIMPLE: LEFT BREAST
LOCATION SIMPLE: RIGHT FOREARM
LOCATION SIMPLE: LEFT UPPER BACK
LOCATION SIMPLE: LEFT BUTTOCK
LOCATION SIMPLE: RIGHT PRETIBIAL REGION
LOCATION SIMPLE: RIGHT THIGH
LOCATION SIMPLE: RIGHT BREAST

## 2020-11-12 ASSESSMENT — BSA RASH: BSA RASH: 10

## 2020-11-12 ASSESSMENT — LOCATION ZONE DERM
LOCATION ZONE: ARM
LOCATION ZONE: TRUNK
LOCATION ZONE: LEG

## 2020-11-12 ASSESSMENT — PAIN INTENSITY VAS: HOW INTENSE IS YOUR PAIN 0 BEING NO PAIN, 10 BEING THE MOST SEVERE PAIN POSSIBLE?: 2/10 PAIN

## 2020-11-12 ASSESSMENT — SEVERITY ASSESSMENT 2020: SEVERITY 2020: MILD

## 2020-11-12 ASSESSMENT — ITCH INTENSITY: HOW SEVERE IS YOUR ITCHING?: 10

## 2020-11-12 NOTE — PROCEDURE: COUNSELING
Detail Level: Detailed
Quality 110: Preventive Care And Screening: Influenza Immunization: Influenza Immunization previously received during influenza season
Detail Level: Simple
Patient Specific Counseling (Will Not Stick From Patient To Patient): \\nShe has evidence of irritant contact dermatitis on the forearms and the back. She itches all over. She has erosions with hemorrhagic crust in areas with no evidence of irritant contact dermatitis. I have started a program of the dermatitis, She has not responded to Gabapentin so I suggested Naltrexon on a daily basis.
Patient Specific Counseling (Will Not Stick From Patient To Patient): \\nI suggested Kenalog injection to help with the dermatitis. This will follow with medicated CeraVe on a daily basis after the shower. She is very dry and I suggested she moisturize 2-3X daily to decrease the dryness.

## 2020-11-12 NOTE — HPI: RASH
What Type Of Note Output Would You Prefer (Optional)?: Standard Output
Is The Patient Presenting As Previously Scheduled?: Yes
How Severe Is Your Rash?: severe
Is This A New Presentation, Or A Follow-Up?: Rash
Additional History: She has had many bacterial and viral infections within the last 6-12 months including bronchitis, whooping cough, shingles, and multiple sinus infections. She has been tested for COVID-19 4 times, all negative. She has been prescribed topical and oral steroids, antibiotics and several other anti-itch medications, and nothing has worked. She has been seen by 2 different dermatologists and various other specialists.

## 2020-11-12 NOTE — PROCEDURE: INTRAMUSCULAR KENALOG
Concentration (Mg/Ml) Of Additional Medication: 2.5
Add Option For Additional Mediation: No
Kenalog Preparation: kenalog
Consent: The risks of atrophy were reviewed with the patient.
Total Volume (Ccs): 1
Concentration (Mg/Ml): 40.0
Expiration Date (Optional): 10/2021
Detail Level: Detailed
Administered By (Optional): YAKELIN Laura
Lot # (Optional): [id number]

## 2023-07-11 NOTE — XMS
Continuity of Care Document
  Created on: 2023
 
 KARIN DOAN
 External Reference #: [id number]
 : 52
 Sex: Female
 
 Demographics
 
 
+-----------------------+------------------------+
| Address               | [address]        |
|                       | [place]  [address]   |
+-----------------------+------------------------+
| Preferred Language    | Unknown                |
+-----------------------+------------------------+
| Marital Status        |                 |
+-----------------------+------------------------+
| Sabianist Affiliation | Unknown                |
+-----------------------+------------------------+
| Race                  | White                  |
+-----------------------+------------------------+
| Ethnic Group          | Not  or  |
+-----------------------+------------------------+
 
 
 Author
 
 
+--------------+--------------------------+
| Author       | [facility]                 |
+--------------+--------------------------+
| Organization | [facility]                 |
+--------------+--------------------------+
| Address      | [address] |
|              | [place]  [address]     |
+--------------+--------------------------+
| Phone        | [phone]          |
+--------------+--------------------------+
 
 
 
 Care Team Providers
 
 
+-----------------------+-------------+-------------+
| Care Team Member Name | Role        | Phone       |
+-----------------------+-------------+-------------+
 Unavailable | Unavailable |
+-----------------------+-------------+-------------+
 Unavailable | Unavailable |
+-----------------------+-------------+-------------+
 
 
 
 Allergies and Intolerances
 
 
 
+-------------+---------------+--------------------+-------------+
|  date       |  description  |  facility          |  type       |
+-------------+---------------+--------------------+-------------+
|  (no date)  |  Hives        |  [facility]  |  (unknown)  |
|             |               [facility]      |             |
+-------------+---------------+--------------------+-------------+
|  (no date)  |  PENICILLINS  |  [facility]  |  (unknown)  |
|             |               | [facility]      |             |
+-------------+---------------+--------------------+-------------+
 
 
 
 Encounters
 No information.
 
 Functional Status
 No information.
 
 Immunizations
 No information.
 
 Medications
 
 
+--------------------+-----------------------------+-----------------------------+
|  date              |  description                |  facility                   |
+--------------------+-----------------------------+-----------------------------+
|  2021 00:00  |  estradiol 0.5 mg oral      |  [facility]  |
|                    | tablet                      | [facility]                        |
+--------------------+-----------------------------+-----------------------------+
|  2021 00:00  |  estradiol 0.5 mg oral      |  [facility]  |
|                    | tablet                      | [facility]                        |
+--------------------+-----------------------------+-----------------------------+
|  2021 00:00  |  omeprazole 20 mg (as       |  [facility]  |
|                    | omeprazole magnesium 20.6   | [facility]                        |
|                    | mg) delayed release oral    |                             |
|                    | capsule                     |                             |
+--------------------+-----------------------------+-----------------------------+
|  2021 00:00  |  omeprazole 20 mg (as       |  [facility]  |
|                    | omeprazole magnesium 20.6   | [facility]                        |
|                    | mg) delayed release oral    |                             |
|                    | capsule                     |                             |
+--------------------+-----------------------------+-----------------------------+
|  2021 00:00  |  sumatriptan 50 mg oral     |  [facility]  |
|                    | tablet                      | [facility]                        |
+--------------------+-----------------------------+-----------------------------+
|  2021 00:00  |  sumatriptan 50 mg oral     |  [facility]  |
|                    | tablet                      | [facility]                        |
+--------------------+-----------------------------+-----------------------------+
|  2021 00:00  |  trazodone hydrochloride 50 |  [facility]  |
|                    |  mg oral tablet             | [facility]                        |
+--------------------+-----------------------------+-----------------------------+
|  2021 00:00  |  trazodone hydrochloride 50 |  [facility]  |
|                    |  mg oral tablet             | [facility]                        |
+--------------------+-----------------------------+-----------------------------+
|  2021 00:00  |  amitriptyline              |  [facility]  |
|                    | hydrochloride 25 mg oral    | [facility]                        |
|                    | tablet                      |                             |
+--------------------+-----------------------------+-----------------------------+
 
|  2021 00:00  |  amitriptyline              |  [facility]  |
|                    | hydrochloride 25 mg oral    | [facility]                        |
|                    | tablet                      |                             |
+--------------------+-----------------------------+-----------------------------+
 
 
 
 Problems
 
 
+--------------------+-----------------------------+-----------------------------+
|  date              |  description                |  facility                   |
+--------------------+-----------------------------+-----------------------------+
|  2013 00:00  |  lumbar radiculopathy       |  [facility]  |
|                    | (disorder)                  | [facility]                        |
+--------------------+-----------------------------+-----------------------------+
|  2013 00:00  |  degeneration of lumbar     |  [facility]  |
|                    | intervertebral disc         | [facility]                        |
|                    | (disorder)                  |                             |
+--------------------+-----------------------------+-----------------------------+
|  2013 00:00  |  bursitis of hip (disorder) |  [facility]  |
|                    |                             | [facility]                        |
+--------------------+-----------------------------+-----------------------------+
|  2013 00:00  |  DDD (degenerative disc     |  [facility]  |
|                    | disease), lumbar            | [facility]                        |
+--------------------+-----------------------------+-----------------------------+
|  2013 00:00  |  Lumbar radiculopathy       |  [facility]  |
|                    |                             | [facility]                        |
+--------------------+-----------------------------+-----------------------------+
|  2013 00:00  |  Trochanteric bursitis      |  [facility]  |
|                    |                             | [facility]                        |
+--------------------+-----------------------------+-----------------------------+
|  2013 00:00  |  sacroiliac joint inflamed  |  [facility]  |
|                    |                             | [facility]                        |
+--------------------+-----------------------------+-----------------------------+
|  2013 00:00  |  Sacroiliitis               |  [facility]  |
|                    |                             | [facility]                        |
+--------------------+-----------------------------+-----------------------------+
|  2015 00:00  |  lateral epicondylitis of   |  [facility]  |
|                    | left humerus (disorder)     | [facility]                        |
+--------------------+-----------------------------+-----------------------------+
|  2015 00:00  |  Lateral epicondylitis of   |  [facility]  |
|                    | left elbow                  | [facility]                        |
+--------------------+-----------------------------+-----------------------------+
|  2016 00:00  |  chronic diarrhea           |  [facility]  |
|                    | (disorder)                  | [facility]                        |
+--------------------+-----------------------------+-----------------------------+
|  2016 00:00  |  drug therapy status        |  [facility]  |
|                    | (situation)                 | [facility]                        |
+--------------------+-----------------------------+-----------------------------+
|  2016 00:00  |  Chronic diarrhea           |  [facility]  |
|                    |                             | [facility]                        |
+--------------------+-----------------------------+-----------------------------+
|  2016 00:00  |  Hormone replacement        |  [facility]  |
|                    | therapy (HRT)               | [facility]                        |
+--------------------+-----------------------------+-----------------------------+
|  2016-10-10 00:00  |  collagenous colitis        |  [facility]  |
|                    | (disorder)                  | [facility]                        |
+--------------------+-----------------------------+-----------------------------+
|  2016-10-10 00:00  |  Collagenous colitis        |  [facility]  |
 
|                    |                             | [facility]                        |
+--------------------+-----------------------------+-----------------------------+
|  2020 00:00  |  migraine (disorder)        |  [facility]  |
|                    |                             | [facility]                        |
+--------------------+-----------------------------+-----------------------------+
|  2020 00:00  |  chronic cough (finding)    |  [facility]  |
|                    |                             | [facility]                        |
+--------------------+-----------------------------+-----------------------------+
|  2020 00:00  |  Migraines                  |  [facility]  |
|                    |                             | [facility]                        |
+--------------------+-----------------------------+-----------------------------+
|  2020 00:00  |  Chronic cough              |  [facility]  |
|                    |                             | [facility]                        |
+--------------------+-----------------------------+-----------------------------+
|  2020 00:00  |  gastro-esophageal reflux   |  [facility]  |
|                    | disease                     | [facility]                        |
+--------------------+-----------------------------+-----------------------------+
|  2020 00:00  |  GERD (gastroesophageal     |  [facility]  |
|                    | reflux disease)             | [facility]                        |
+--------------------+-----------------------------+-----------------------------+
|  2020-10-13 00:00  |  chronic pansinusitis       |  [facility]  |
|                    | (disorder)                  | [facility]                        |
+--------------------+-----------------------------+-----------------------------+
|  2020-10-13 00:00  |  Chronic pansinusitis       |  [facility]  |
|                    |                             | [facility]                        |
+--------------------+-----------------------------+-----------------------------+
|  2020-10-20 00:00  |  deviated nasal septum      |  [facility]  |
|                    | (disorder)                  | [facility]                        |
+--------------------+-----------------------------+-----------------------------
|  2020-10-20 00:00  |  hypertrophy of nasal       |  [facility]  |
|                    | turbinates (disorder)       | [facility]                        |
+--------------------+-----------------------------+-----------------------------+
|  2020-10-20 00:00  |  Deviated nasal septum      |  [facility]  |
|                    |                             | [facility]                        |
+--------------------+-----------------------------+-----------------------------+
|  2020-10-20 00:00  |  Hypertrophy of nasal       |  [facility]  |
|                    | turbinates                  | [facility]                        |
+--------------------+-----------------------------+-----------------------------+
|  2020 00:00  |  lumbar pain                |  [facility]  |
|                    |                             | [facility]                        |
+--------------------+-----------------------------+-----------------------------+
|  2020 00:00  |  moderate persistent asthma |  [facility]  |
|                    |  (disorder)                 | [facility]                        |
+--------------------+-----------------------------+-----------------------------+
|  2020 00:00  |  neck ache                  |  [facility]  |
|                    |                             | [facility]                        |
+--------------------+-----------------------------+-----------------------------+
|  2020 00:00  |  paresthesia (finding)      |  [facility]  |
|                    |                             | [facility]                        |
+--------------------+-----------------------------+-----------------------------+
|  2020 00:00  |  Asthma, moderate           |  [facility]  |
|                    | persistent                  | [facility]                        |
+--------------------+-----------------------------+-----------------------------+
|  2020 00:00  |  Neck pain                  |  [facility]  |
|                    |                             | [facility]                        |
+--------------------+-----------------------------+-----------------------------2020 00:00  |  Lumbar back pain           |  [facility]  |
|                    |                             | [facility]                        |
+--------------------+-----------------------------+-----------------------------+
|  2020 00:00  |  Paresthesia                |  [facility]  |
 
|                    |                             | [facility]                        |
+--------------------+-----------------------------+-----------------------------+
 
 
 
 Procedures
 No information.
 
 Results/Labs
 No information.
 
 Social History
 
 
+--------------------+----------------+-----------------------------+
|  date              |  description   |  facility                   |
+--------------------+----------------+-----------------------------+
|  2020-12-10 00:00  |  Never smoker  |  [facility]  |
|                    |                | [facility]                        |
+--------------------+----------------+-----------------------------+
 
 
 
 Vital Signs
 No information.

## 2023-07-11 NOTE — XMS
Continuity of Care Document
  Created on: 2023
 
 KARIN DOAN
 External Reference #: [id number]
 : 52
 Sex: Female
 
 Demographics
 
 
+-----------------------+------------------------+
| Address               | [address]        |
|                       | [place]  [address]   |
+-----------------------+------------------------+
| Preferred Language    | Unknown                |
+-----------------------+------------------------+
| Marital Status        |                 |
+-----------------------+------------------------+
| Faith Affiliation | Unknown                |
+-----------------------+------------------------+
| Race                  | White                  |
+-----------------------+------------------------+
| Ethnic Group          | Not  or  |
+-----------------------+------------------------+
 
 
 Author
 
 
+--------------+--------------------------+
| Author       | [facility]                 |
+--------------+--------------------------+
| Organization | [facility]                 |
+--------------+--------------------------+
| Address      | [address] |
|              | [place]  [address]     |
+--------------+--------------------------+
| Phone        | [phone]          |
+--------------+--------------------------+
 
 
 
 Care Team Providers
 
 
+-----------------------+-------------+-------------+
| Care Team Member Name | Role        | Phone       |
+-----------------------+-------------+-------------+
 Unavailable | Unavailable |
+-----------------------+-------------+-------------+
 Unavailable | Unavailable |
+-----------------------+-------------+-------------+
 
 
 
 Allergies and Intolerances
 
 
 
+-------------+---------------+--------------------+-------------+
|  date       |  description  |  facility          |  type       |
+-------------+---------------+--------------------+-------------+
|  (no date)  |  H[facility]        |  [facility]  |  (unknown)  |
|             |               [facility]      |             |
+-------------+---------------+--------------------+-------------+
|  (no date)  |  PENICILLINS  |  [facility]  |  (unknown)  |
|             |               | [facility]      |             |
+-------------+---------------+--------------------+-------------+
 
 
 
 Encounters
 No information.
 
 Functional Status
 No information.
 
 Immunizations
 No information.
 
 Medications
 
 
+--------------------+-----------------------------+-----------------------------+
|  date              |  description                |  facility                   |
+--------------------+-----------------------------+-----------------------------+
|  2021 00:00  |  estradiol 0.5 mg oral      |  [facility]  |
|                    | tablet                      | [facility]                        |
+--------------------+-----------------------------+-----------------------------+
|  2021 00:00  |  estradiol 0.5 mg oral      |  [facility]  |
|                    | tablet                      | [facility]                        |
+--------------------+-----------------------------+-----------------------------+
|  2021 00:00  |  omeprazole 20 mg (as       |  [facility]  |
|                    | omeprazole magnesium 20.6   | [facility]                        |
|                    | mg) delayed release oral    |                             |
|                    | capsule                     |                             |
+--------------------+-----------------------------+-----------------------------+
|  2021 00:00  |  omeprazole 20 mg (as       |  [facility]  |
|                    | omeprazole magnesium 20.6   | [facility]                        |
|                    | mg) delayed release oral    |                             |
|                    | capsule                     |                             |
+--------------------+-----------------------------+-----------------------------+
|  2021 00:00  |  sumatriptan 50 mg oral     |  [facility]  |
|                    | tablet                      | [facility]                        |
+--------------------+-----------------------------+-----------------------------+
|  2021 00:00  |  sumatriptan 50 mg oral     |  [facility]  |
|                    | tablet                      | [facility]                        |
+--------------------+-----------------------------+-----------------------------+
|  2021 00:00  |  trazodone hydrochloride 50 |  [facility]  |
|                    |  mg oral tablet             | [facility]                        |
+--------------------+-----------------------------+-----------------------------+
|  2021 00:00  |  trazodone hydrochloride 50 |  [facility]  |
|                    |  mg oral tablet             | [facility]                        |
+--------------------+-----------------------------+-----------------------------+
|  2021 00:00  |  amitriptyline              |  [facility]  |
|                    | hydrochloride 25 mg oral    | [facility]                        |
|                    | tablet                      |                             |
+--------------------+-----------------------------+-----------------------------+
 
|  2021 00:00  |  amitriptyline              |  [facility]  |
|                    | hydrochloride 25 mg oral    | [facility]                        |
|                    | tablet                      |                             |
+--------------------+-----------------------------+-----------------------------+
 
 
 
 Problems
 
 
+--------------------+-----------------------------+-----------------------------+
|  date              |  description                |  facility                   |
+--------------------+-----------------------------+-----------------------------+
|  2013 00:00  |  lumbar radiculopathy       |  [facility]  |
|                    | (disorder)                  | [facility]                        |
+--------------------+-----------------------------+-----------------------------+
|  2013 00:00  |  degeneration of lumbar     |  [facility]  |
|                    | intervertebral disc         | [facility]                        |
|                    | (disorder)                  |                             |
+--------------------+-----------------------------+-----------------------------+
|  2013 00:00  |  bursitis of hip (disorder) |  [facility]  |
|                    |                             | [facility]                        |
+--------------------+-----------------------------+-----------------------------+
|  2013 00:00  |  DDD (degenerative disc     |  [facility]  |
|                    | disease), lumbar            | [facility]                        |
+--------------------+-----------------------------+-----------------------------+
|  2013 00:00  |  Lumbar radiculopathy       |  [facility]  |
|                    |                             | [facility]                        |
+--------------------+-----------------------------+-----------------------------+
|  2013 00:00  |  Trochanteric bursitis      |  [facility]  |
|                    |                             | [facility]                        |
+--------------------+-----------------------------+-----------------------------+
|  2013 00:00  |  sacroiliac joint inflamed  |  [facility]  |
|                    |                             | [facility]                        |
+--------------------+-----------------------------+-----------------------------+
|  2013 00:00  |  Sacroiliitis               |  [facility]  |
|                    |                             | [facility]                        |
+--------------------+-----------------------------+-----------------------------+
|  2015 00:00  |  lateral epicondylitis of   |  [facility]  |
|                    | left humerus (disorder)     | [facility]                        |
+--------------------+-----------------------------+-----------------------------+
|  2015 00:00  |  Lateral epicondylitis of   |  [facility]  |
|                    | left elbow                  | [facility]                        |
+--------------------+-----------------------------+-----------------------------+
|  2016 00:00  |  chronic diarrhea           |  [facility]  |
|                    | (disorder)                  | [facility]                        |
+--------------------+-----------------------------+-----------------------------+
|  2016 00:00  |  drug therapy status        |  [facility]  |
|                    | (situation)                 | [facility]                        |
+--------------------+-----------------------------+-----------------------------+
|  2016 00:00  |  Chronic diarrhea           |  [facility]  |
|                    |                             | [facility]                        |
+--------------------+-----------------------------+-----------------------------+
|  2016 00:00  |  Hormone replacement        |  [facility]  |
|                    | therapy (HRT)               | [facility]                        |
+--------------------+-----------------------------+-----------------------------+
|  2016-10-10 00:00  |  collagenous colitis        |  [facility]  |
|                    | (disorder)                  | [facility]                        |
+--------------------+-----------------------------+-----------------------------+
|  2016-10-10 00:00  |  Collagenous colitis        |  [facility]  |
 
|                    |                             | [facility]                        |
+--------------------+-----------------------------+-----------------------------+
|  2020 00:00  |  migraine (disorder)        |  [facility]  |
|                    |                             | [facility]                        |
+--------------------+-----------------------------+-----------------------------+
|  2020 00:00  |  chronic cough (finding)    |  [facility]  |
|                    |                             | [facility]                        |
+--------------------+-----------------------------+-----------------------------+
|  2020 00:00  |  Migraines                  |  [facility]  |
|                    |                             | [facility]                        |
+--------------------+-----------------------------+-----------------------------+
|  2020 00:00  |  Chronic cough              |  [facility]  |
|                    |                             | [facility]                        |
+--------------------+-----------------------------+-----------------------------+
|  2020 00:00  |  gastro-esophageal reflux   |  [facility]  |
|                    | disease                     | [facility]                        |
+--------------------+-----------------------------+-----------------------------+
|  2020 00:00  |  GERD (gastroesophageal     |  [facility]  |
|                    | reflux disease)             | [facility]                        |
+--------------------+-----------------------------+-----------------------------+
|  2020-10-13 00:00  |  chronic pansinusitis       |  [facility]  |
|                    | (disorder)                  | [facility]                        |
+--------------------+-----------------------------+-----------------------------+
|  2020-10-13 00:00  |  Chronic pansinusitis       |  [facility]  |
|                    |                             | [facility]                        |
+--------------------+-----------------------------+-----------------------------+
|  2020-10-20 00:00  |  deviated nasal septum      |  [facility]  |
|                    | (disorder)                  | [facility]                        |
+--------------------+-----------------------------+-----------------------------
|  2020-10-20 00:00  |  hypertrophy of nasal       |  [facility]  |
|                    | turbinates (disorder)       | [facility]                        |
+--------------------+-----------------------------+-----------------------------+
|  2020-10-20 00:00  |  Deviated nasal septum      |  [facility]  |
|                    |                             | [facility]                        |
+--------------------+-----------------------------+-----------------------------+
|  2020-10-20 00:00  |  Hypertrophy of nasal       |  [facility]  |
|                    | turbinates                  | [facility]                        |
+--------------------+-----------------------------+-----------------------------+
|  2020 00:00  |  lumbar pain                |  [facility]  |
|                    |                             | [facility]                        |
+--------------------+-----------------------------+-----------------------------+
|  2020 00:00  |  moderate persistent asthma |  [facility]  |
|                    |  (disorder)                 | [facility]                        |
+--------------------+-----------------------------+-----------------------------+
|  2020 00:00  |  neck ache                  |  [facility]  |
|                    |                             | [facility]                        |
+--------------------+-----------------------------+-----------------------------+
|  2020 00:00  |  paresthesia (finding)      |  [facility]  |
|                    |                             | [facility]                        |
+--------------------+-----------------------------+-----------------------------+
|  2020 00:00  |  Asthma, moderate           |  [facility]  |
|                    | persistent                  | [facility]                        |
+--------------------+-----------------------------+-----------------------------+
|  2020 00:00  |  Neck pain                  |  [facility]  |
|                    |                             | [facility]                        |
+--------------------+-----------------------------+-----------------------------2020 00:00  |  Lumbar back pain           |  [facility]  |
|                    |                             | [facility]                        |
+--------------------+-----------------------------+-----------------------------+
|  2020 00:00  |  Paresthesia                |  [facility]  |
 
|                    |                             | [facility]                        |
+--------------------+-----------------------------+-----------------------------+
 
 
 
 Procedures
 No information.
 
 Results/Labs
 No information.
 
 Social History
 
 
+--------------------+----------------+-----------------------------+
|  date              |  description   |  facility                   |
+--------------------+----------------+-----------------------------+
|  2020-12-10 00:00  |  Never smoker  |  [facility]  |
|                    |                | [facility]                        |
+--------------------+----------------+-----------------------------+
 
 
 
 Vital Signs
 No information.

## 2023-07-13 NOTE — EKG
[facility]
                                    [address]
                                  [place], [place]  [address]
_________________________________________________________________________________________
                                                                 Signed   
 
 
Normal sinus rhythm
Nonspecific ST abnormality
Abnormal ECG
No previous ECGs available
Confirmed by ABDIRIZAK PEÑA MD ([id number]) on 7/13/2023 4:44:46 PM
 
 
 
 
 
 
 
 
 
 
 
 
 
 
 
 
 
 
 
 
 
 
 
 
 
 
 
 
 
 
 
 
 
 
 
 
 
 
 
 
    Electronically Signed By: ABDIRIZAK PEÑA  07/13/23 1645
_________________________________________________________________________________________
PATIENT NAME:     KARIN DOANI              
MEDICAL RECORD #: [id number]                     Electrocardiogram             
          ACCT #: [id number]  
DATE OF BIRTH:   [birth year]                                       
PHYSICIAN:   ABDIRIZAK PEÑA                       REPORT #: [id number]
REPORT IS CONFIDENTIAL AND NOT TO BE RELEASED WITHOUT AUTHORIZATION

## 2023-07-14 NOTE — XMS
PreManage Notification: KARIN DOAN MRN:[id number]
 
Security Information
 
Security Events
No recent Security Events currently on file
 
 
 
CRITERIA MET
------------
- [facility] - 2 Visits in 30 Days
 
 
CARE PROVIDERS
There are no care providers on record at this time.
 
[facility] has no Care Guidelines for this patient.
 
[facility] VISIT COUNT (12 MO.)
-------------------------------------------------------------------------------------
[facility]
-------------------------------------------------------------------------------------
TOTAL 2
-------------------------------------------------------------------------------------
NOTE: Visits indicate total known visits.
 
[place] [place] (12 MO.)
-------------------------------------------------------------------------------------
[facility]23 [facility]
 
[facility]
 
COMPLAINT:
- SYNCOPE
-------------------------------------------------------------------------------------
07/11/2023 18:24
[facility] [place]
 
TYPE: Emergency
 
COMPLAINT:
- VOMITING
 
DIAGNOSES:
- Allergy status to penicillin
- COVID-19
- Dehydration
- Other long term (current) drug therapy
- Weakness
-------------------------------------------------------------------------------------
 
 
INPATIENT VISIT TRACKING (12 MO.)
No inpatient visits to display in this time frame
 
[email or web address]

## 2023-07-14 NOTE — XMS
Continuity of Care Document
  Created on: 2023
 
 KARIN DOAN
 External Reference #: [id number]
 : 52
 Sex: Female
 
 Demographics
 
 
+-----------------------+------------------------+
| Address               | [address]        |
|                       | [place]  [id number]   |
+-----------------------+------------------------+
| Preferred Language    | Unknown                |
+-----------------------+------------------------+
| Marital Status        |                 |
+-----------------------+------------------------+
| Congregation Affiliation | Unknown                |
+-----------------------+------------------------+
| Race                  | White                  |
+-----------------------+------------------------+
| Ethnic Group          | Not  or  |
+-----------------------+------------------------+
 
 
 Author
 
 
+--------------+--------------------------+
| Author       | [facility]                 |
+--------------+--------------------------+
| Organization | [facility]                 |
+--------------+--------------------------+
| Address      | [address] |
|              | [place]  [address]     |
+--------------+--------------------------+
| Phone        | [phone]          |
+--------------+--------------------------+
 
 
 
 Care Team Providers
 
 
+-----------------------+-------------+-------------+
| Care Team Member Name | Role        | Phone       |
+-----------------------+-------------+-------------+
 Unavailable | Unavailable |
+-----------------------+-------------+-------------+
 Unavailable | Unavailable |
+-----------------------+-------------+-------------+
 
 
 
 Allergies and Intolerances
 
 
 
+-------------+---------------+----------------+-----------------+-----------------+
|  date       |  description  |  facility      |  reaction       |  severity       |
+-------------+---------------+----------------+-----------------+-----------------+
|  (no date)  |  Hives        |  [facility]          |  (no reaction)  |  (no severity)  |
|             |               | [facility]    |                 |                 |
|             |               | [facility]  |                 |                 |
+-------------+---------------+----------------+-----------------+-----------------+
|  (no date)  |  Urticaria    |  [facility]       |  (no reaction)  |  (no severity)  |
|             |               | [facility]        |                 |                 |
|             |               | Hospital       |                 |                 |
+-------------+---------------+----------------+-----------------+-----------------+
|  (no date)  |  Penicillin   |  [facility]       |  (no reaction)  |  (no severity)  |
|             |               | [facility]        |                 |                 |
|             |               | Hospital       |                 |                 |
+-------------+---------------+----------------+-----------------+-----------------+
|  (no date)  |  Penicillin   |  [facility]       |  (no reaction)  |  (no severity)  |
|             |               | [facility]        |                 |                 |
|             |               | Hospital       |                 |                 |
+-------------+---------------+----------------+-----------------+-----------------+
|  (no date)  |  Penicillins  |  [facility]           |  (no reaction)  |  (no severity)  |
+-------------+---------------+----------------+-----------------+-----------------+
|  (no date)  |  PENICILLINS  |  [facility]          |  (no reaction)  |  (no severity)  |
|             |               | [facility]    |                 |                 |
|             |               | [facility]  |                 |                 |
+-------------+---------------+----------------+-----------------+-----------------+
|  (no date)  |  Penicillin   |  [facility]       |  (no reaction)  |  (no severity)  |
|             |               | [facility]        |                 |                 |
|             |               | Hospital       |                 |                 |
+-------------+---------------+----------------+-----------------+-----------------+
|  (no date)  |  Penicillin   |  [facility]       |  (no reaction)  |  (no severity)  |
|             |               | [facility]        |                 |                 |
|             |               | Hospital       |                 |                 |
+-------------+---------------+----------------+-----------------+-----------------+
 
 
 
 Encounters
 No information.
 
 Functional Status
 No information.
 
 Immunizations
 
 
+--------------------+---------------+----------------------------+
|  date              |  description  |  facility                  |
+--------------------+---------------+----------------------------+
|  2017 00:00  |  Tdap         |  [facility]  |
+--------------------+---------------+----------------------------+
 
 
 
 Medications
 
 
+--------------------+-----------------------------+-----------------------------+
|  date              |  description                |  facility                   |
+--------------------+-----------------------------+-----------------------------+
 
|  2023 00:00  |  ONDANSETRON                |  [facility]   |
+--------------------+-----------------------------+-----------------------------+
|  2021 00:00  |  estradiol 0.5 mg oral      |  [facility]  |
|                    | tablet                      | [facility]                        |
+--------------------+-----------------------------+-----------------------------+
|  2021 00:00  |  estradiol 0.5 mg oral      |  [facility]  |
|                    | tablet                      | [facility]                        |
+--------------------+-----------------------------+-----------------------------+
|  2023 00:00  |  ESTRADIOL                  |  [facility]   |
+--------------------+-----------------------------+-----------------------------+
|  2021 00:00  |  omeprazole 20 mg (as       |  [facility]  |
|                    | omeprazole magnesium 20.6   | [facility]                        |
|                    | mg) delayed release oral    |                             |
|                    | capsule                     |                             |
+--------------------+-----------------------------+-----------------------------+
|  2021 00:00  |  omeprazole 20 mg (as       |  [facility]  |
|                    | omeprazole magnesium 20.6   | [facility]                        |
|                    | mg) delayed release oral    |                             |
|                    | capsule                     |                             |
+--------------------+-----------------------------+-----------------------------+
|  2023 00:00  |  SPIRONOLACTONE             |  [facility]   |
+--------------------+-----------------------------+-----------------------------+
|  2023 00:00  |  MONTELUKAST SODIUM         |  [facility]   |
+--------------------+-----------------------------+-----------------------------+
|  2023 00:00  |  OMEPRAZOLE                 |  [facility]   |
+--------------------+-----------------------------+-----------------------------+
|  2023 00:00  |                             |  [facility]   |
|                    | Fluticasone/Umeclidin/Vilan |                             |
|                    | ter                         |                             |
+--------------------+-----------------------------+-----------------------------+
|  2023 00:00  |  Nirmatrelvir/Ritonavir     |  [facility]   |
+--------------------+-----------------------------+-----------------------------+
|  2023 00:00  |  FAMOTIDINE                 |  [facility]   |
+--------------------+-----------------------------+-----------------------------+
|  2023 00:00  |  GABAPENTIN                 |  [facility]   |
+--------------------+-----------------------------+-----------------------------+
|  2018 00:00  |  ONDANSETRON                |  [facility]   |
+--------------------+-----------------------------+-----------------------------+
|  2020 00:00  |  predniSONE                 |  [facility]   |
+--------------------+-----------------------------+-----------------------------+
|  2023 00:00  |  SUMATRIPTAN SUCCINATE      |  [facility]   |
+--------------------+-----------------------------+-----------------------------+
|  2021 00:00  |  sumatriptan 50 mg oral     |  [facility]  |
|                    | tablet                      | [facility]                        |
+--------------------+-----------------------------+-----------------------------+
|  2021 00:00  |  sumatriptan 50 mg oral     |  [facility]  |
|                    | tablet                      | [facility]                        |
+--------------------+-----------------------------+-----------------------------+
|  2023 00:00  |  VALACYCLOVIR HCL           |  [facility]   |
+--------------------+-----------------------------+-----------------------------+
|  2020 00:00  |  AMOXICILLIN/POTASSIUM CLAV |  [facility]   |
|                    |                             |                             |
+--------------------+-----------------------------+-----------------------------+
|  2023 00:00  |  TRAZODONE HCL              |  [facility]   |
+--------------------+-----------------------------+-----------------------------+
|  2021 00:00  |  trazodone hydrochloride 50 |  [facility]  |
|                    |  mg oral tablet             | [facility]                        |
+--------------------+-----------------------------+-----------------------------+
|  2021 00:00  |  trazodone hydrochloride 50 |  [facility]  |
|                    |  mg oral tablet             | [facility]                        |
 
+--------------------+-----------------------------+-----------------------------+
|  2021 00:00  |  amitriptyline              |  [facility]  |
|                    | hydrochloride 25 mg oral    | [facility]                        |
|                    | tablet                      |                             |
+--------------------+-----------------------------+-----------------------------+
|  2021 00:00  |  amitriptyline              |  [facility]  |
|                    | hydrochloride 25 mg oral    | [facility]                        |
|                    | tablet                      |                             |
+--------------------+-----------------------------+-----------------------------+
|  2023 00:00  |  HYDROCODONE                |  [facility]   |
|                    | BIT/ACETAMINOPHEN           |                             |
+--------------------+-----------------------------+-----------------------------+
|  2023 00:00  |  ALBUTEROL SULFATE          |  [facility]   |
+--------------------+-----------------------------+-----------------------------+
|  2020 00:00  |  Codeine                    |  [facility]   |
|                    | Phosphate/Guaifenesin       |                             |
+--------------------+-----------------------------+-----------------------------+
 
 
 
 Problems
 
 
+--------------------+-----------------------------+-----------------------------+
|  date              |  description                |  facility                   |
+--------------------+-----------------------------+-----------------------------+
|  2013 00:00  |  lumbar radiculopathy       |  [facility]  |
|                    | (disorder)                  | [facility]                        |
+--------------------+-----------------------------+-----------------------------+
|  2013 00:00  |  degeneration of lumbar     |  [facility]  |
|                    | intervertebral disc         | [facility]                        |
|                    | (disorder)                  |                             |
+--------------------+-----------------------------+-----------------------------+
|  2013 00:00  |  bursitis of hip (disorder) |  [facility]  |
|                    |                             | [facility]                        |
+--------------------+-----------------------------+-----------------------------+
|  2013 00:00  |  DDD (degenerative disc     |  [facility]  |
|                    | disease), lumbar            | [facility]                        |
+--------------------+-----------------------------+-----------------------------+
|  2013 00:00  |  Lumbar radiculopathy       |  [facility]  |
|                    |                             | [facility]                        |
+--------------------+-----------------------------+-----------------------------+
|  2013 00:00  |  Trochanteric bursitis      |  [facility]  |
|                    |                             | [facility]                        |
+--------------------+-----------------------------+-----------------------------+
|  2013 00:00  |  sacroiliac joint inflamed  |  [facility]  |
|                    |                             | [facility]                        |
+--------------------+-----------------------------+-----------------------------+
|  2013 00:00  |  Sacroiliitis               |  [facility]  |
|                    |                             | [facility]                        |
+--------------------+-----------------------------+-----------------------------+
|  2015 00:00  |  lateral epicondylitis of   |  [facility]  |
|                    | left humerus (disorder)     | [facility]                        |
+--------------------+-----------------------------+-----------------------------+
|  2015 00:00  |  Lateral epicondylitis of   |  [facility]  |
|                    | left elbow                  | [facility]                        |
+--------------------+-----------------------------+-----------------------------+
|  2016 00:00  |  chronic diarrhea           |  [facility]  |
|                    | (disorder)                  | [facility]                        |
+--------------------+-----------------------------+-----------------------------+
 
|  2016 00:00  |  drug therapy status        |  [facility]  |
|                    | (situation)                 | [facility]                        |
+--------------------+-----------------------------+-----------------------------+
|  2016 00:00  |  Chronic diarrhea           |  [facility]  |
|                    |                             | [facility]                        |
+--------------------+-----------------------------+-----------------------------+
|  2016 00:00  |  Hormone replacement        |  [facility]  |
|                    | therapy (HRT)               | [facility]                        |
+--------------------+-----------------------------+-----------------------------+
|  2016-10-10 00:00  |  collagenous colitis        |  [facility]  |
|                    | (disorder)                  | [facility]                        |
+--------------------+-----------------------------+-----------------------------+
|  2016-10-10 00:00  |  Collagenous colitis        |  [facility]  |
|                    |                             | [facility]                        |
+--------------------+-----------------------------+-----------------------------+
|  2017 00:00  |  Laceration of scalp        |  [facility]   |
+--------------------+-----------------------------+-----------------------------+
|  2017 00:00  |  Traumatic injury of head   |  [facility]   |
+--------------------+-----------------------------+-----------------------------+
|  2018 00:00  |  Nonspecific abdominal pain |  [facility]   |
|                    |                             |                             |
+--------------------+-----------------------------+-----------------------------+
|  2018 00:00  |  Nausea and vomiting        |  [facility]   |
+--------------------+-----------------------------+-----------------------------+
|  2018 00:00  |  Headache                   |  [facility]   |
+--------------------+-----------------------------+-----------------------------+
|  2020 00:00  |  migraine (disorder)        |  [facility]  |
|                    |                             | [facility]                        |
+--------------------+-----------------------------+-----------------------------+
|  2020 00:00  |  chronic cough (finding)    |  [facility]  |
|                    |                             | [facility]                        |
+--------------------+-----------------------------+-----------------------------+
|  2020 00:00  |  Migraines                  |  [facility]  |
|                    |                             | [facility]                        |
+--------------------+-----------------------------+-----------------------------+
|  2020 00:00  |  Chronic cough              |  [facility]  |
|                    |                             | [facility]                        |
+--------------------+-----------------------------+-----------------------------+
|  2020 00:00  |  gastro-esophageal reflux   |  [facility]  |
|                    | disease                     | [facility]                        |
+--------------------+-----------------------------+-----------------------------+
|  2020 00:00  |  GERD (gastroesophageal     |  [facility]  |
|                    | reflux disease)             | [facility]                        |
+--------------------+-----------------------------+-----------------------------+
|  2020-10-13 00:00  |  chronic pansinusitis       |  [facility]  |
|                    | (disorder)                  | [facility]                        |
+--------------------+-----------------------------+-----------------------------+
|  2020-10-13 00:00  |  Chronic pansinusitis       |  [facility]  |
|                    |                             | [facility]                        |
+--------------------+-----------------------------+-----------------------------+
|  2020-10-20 00:00  |  deviated nasal septum      |  [facility]  |
|                    | (disorder)                  | [facility]                        |
+--------------------+-----------------------------+-----------------------------+
|  2020-10-20 00:00  |  hypertrophy of nasal       |  [facility]  |
|                    | turbinates (disorder)       | [facility]                        |
+--------------------+-----------------------------+-----------------------------+
|  2020-10-20 00:00  |  Deviated nasal septum      |  [facility]  |
|                    |                             | [facility]                        |
+--------------------+-----------------------------+-----------------------------+
|  2020-10-20 00:00  |  Hypertrophy of nasal       |  [facility]  |
 
|                    | turbinates                  | [facility]                        |
+--------------------+-----------------------------+-----------------------------+
|  2020 00:00  |  lumbar pain                |  [facility]  |
|                    |                             | [facility]                        |
+--------------------+-----------------------------+-----------------------------+
|  2020 00:00  |  moderate persistent asthma |  [facility]  |
|                    |  (disorder)                 | [facility]                        |
+--------------------+-----------------------------+-----------------------------+
|  2020 00:00  |  neck ache                  |  [facility]  |
|                    |                             | [facility]                        |
+--------------------+-----------------------------+-----------------------------+
|  2020 00:00  |  paresthesia (finding)      |  [facility]  |
|                    |                             | [facility]                        |
+--------------------+-----------------------------+-----------------------------+
|  2020 00:00  |  Asthma, moderate           |  [facility]  |
|                    | persistent                  | [facility]                        |
+--------------------+-----------------------------+-----------------------------+
|  2020 00:00  |  Neck pain                  |  [facility]  |
|                    |                             | [facility]                        |
+--------------------+-----------------------------+-----------------------------+
|  2020 00:00  |  Lumbar back pain           |  [facility]  |
|                    |                             | [facility]                        |
+--------------------+-----------------------------+-----------------------------+
|  2020 00:00  |  Paresthesia                |  [facility]  |
|                    |                             | [facility]                        |
+--------------------+-----------------------------+-----------------------------+
|  2023 09:09  |  ENCNTR SCREEN MAMMOGRAM    |  [facility]                        |
|                    | FOR MALIGNANT NEOPLASM OF   |                             |
|                    | BREAST                      |                             |
+--------------------+-----------------------------+-----------------------------+
|  2023 10:21  |  AGE-RELATED OSTEOPOROSIS   |  [facility]                        |
|                    | W/O CURRENT PAT             |                             |
+--------------------+-----------------------------+-----------------------------+
|  2023 10:21  |  ENCOUNTER FOR SCREENING    |  [facility]                        |
|                    | FOR OSTEOPOROSIS            |                             |
+--------------------+-----------------------------+-----------------------------+
|  2023 10:21  |  ASYMPTOMATIC MENOPAUSAL    |  [facility]                        |
|                    | STATE                       |                             |
+--------------------+-----------------------------+-----------------------------+
|  2023 00:00  |  Dehydration                |  [facility]   |
+--------------------+-----------------------------+-----------------------------+
|  2023 00:00  |  Infection due to severe    |  [facility]   |
|                    | acute respiratory syndrome  |                             |
|                    | coronavirus 2 (SARS-CoV-2)  |                             |
+--------------------+-----------------------------+-----------------------------+
|  2023 18:24  |  DEHYDRATION                |  [facility]                        |
+--------------------+-----------------------------+-----------------------------+
|  2023 18:24  |  WEAKNESS                   |  [facility]                        |
+--------------------+-----------------------------+-----------------------------+
|  2023 18:24  |  COVID-19                   |  [facility]                        |
+--------------------+-----------------------------+-----------------------------+
|  2023 18:24  |  OTHER LONG TERM (CURRENT)  |  [facility]                        |
|                    | DRUG THERAPY                |                             |
+--------------------+-----------------------------+-----------------------------+
|  2023 18:24  |  ALLERGY STATUS TO          |  [facility]                        |
|                    | [facility]                  |                             |
+--------------------+-----------------------------+-----------------------------+
 
 
 
 
 Procedures
 No information.
 
 Results/Labs
 
 
+--------+--------+------------+---------+--------+---------+
|  test  |  date  |  facility  |  value  |  unit  |  notes  |
+--------+--------+------------+---------+--------+---------+
 
 
 
+------------------+
|  Result panel 1  |
+------------------+
 
 
 
+--------------+--------------+-----------+-------+-------------+-------------+
|              |  2023  |  [facility]  |  5.3  |  (missing)  |  (missing)  |
| (unavailable |      | [facility]   |       |             |             |
| )            |              | Hospital  |       |             |             |
+--------------+--------------+-----------+-------+-------------+-------------+
 
 
 
+------------------+
|  Result panel 2  |
+------------------+
 
 
 
+--------------+--------------+-----------+--------+-------------+-------------+
|              |  2023  |  [facility]  |  76.8  |  (missing)  |  (missing)  |
| (unavailable | ::     | [facility]   |        |             |             |
| )            |              | Hospital  |        |             |             |
+--------------+--------------+-----------+--------+-------------+-------------+
 
 
 
+------------------+
|  Result panel 3  |
+------------------+
 
 
 
+--------------+--------------+-----------+--------+-------------+-------------+
|              |  2023  |  [facility]  |  14.6  |  (missing)  |  (missing)  |
| (unavailable | 21::07     | [facility]   |        |             |             |
| )            |              | Hospital  |        |             |             |
+--------------+--------------+-----------+--------+-------------+-------------+
 
 
 
+------------------+
|  Result panel 4  |
+------------------+
 
 
 
 
+--------------+--------------+-----------+-------+-------------+-------------+
|              |  2023  |  [facility]  |  8.4  |  (missing)  |  (missing)  |
| (unavailable | 21::07     | [facility]   |       |             |             |
| )            |              | Hospital  |       |             |             |
+--------------+--------------+-----------+-------+-------------+-------------+
 
 
 
+------------------+
|  Result panel 5  |
+------------------+
 
 
 
+--------------+--------------+-----------+-------+-------------+-------------+
|              |  2023  |  [facility]  |  0.0  |  (missing)  |  (missing)  |
| (unavailable | 21::07     | [facility]   |       |             |             |
| )            |              | Hospital  |       |             |             |
+--------------+--------------+-----------+-------+-------------+-------------+
 
 
 
+------------------+
|  Result panel 6  |
+------------------+
 
 
 
+--------------+--------------+-----------+-------+-------------+-------------+
|              |  2023  |  [facility]  |  0.2  |  (missing)  |  (missing)  |
| (unavailable | 21:01:07     | [facility]   |       |             |             |
| )            |              | Hospital  |       |             |             |
+--------------+--------------+-----------+-------+-------------+-------------+
 
 
 
+------------------+
|  Result panel 7  |
+------------------+
 
 
 
+--------------+--------------+-----------+--------+-------------+-------------+
|              |  2023  |  [facility]  |  4.57  |  (missing)  |  (missing)  |
| (unavailable | 21:01:07     | [facility]   |        |             |             |
| )            |              | Hospital  |        |             |             |
+--------------+--------------+-----------+--------+-------------+-------------+
 
 
 
+------------------+
|  Result panel 8  |
+------------------+
 
 
 
+--------------+--------------+-----------+-------+---------+-------------+
|              |  2023  |  [facility]  |  139  |  mg/dL  |  (missing)  |
| (unavailable | 21:01:07     | [facility]   |       |         |             |
| )            |              | Hospital  |       |         |             |
 
+--------------+--------------+-----------+-------+---------+-------------+
 
 
 
+------------------+
|  Result panel 9  |
+------------------+
 
 
 
+--------------+--------------+-----------+------+---------+-------------+
|              |  2023  |  [facility]  |  15  |  mg/dL  |  (missing)  |
| (unavailable | 21:01:07     | [facility]   |      |         |             |
| )            |              | Hospital  |      |         |             |
+--------------+--------------+-----------+------+---------+-------------+
 
 
 
+-------------------+
|  Result panel 10  |
+-------------------+
 
 
 
+--------------+--------------+-----------+--------+---------+-------------+
|              |  2023  |  [facility]  |  0.86  |  mg/dL  |  (missing)  |
| (unavailable | 21:01:07     | [facility]   |        |         |             |
| )            |              | Hospital  |        |         |             |
+--------------+--------------+-----------+--------+---------+-------------+
 
 
 
+-------------------+
|  Result panel 11  |
+-------------------+
 
 
 
+--------------+--------------+-----------+--------+-------------+-------------+
|              |  2023  |  [facility]  |  13.2  |  (missing)  |  (missing)  |
| (unavailable | 21:01:07     | [facility]   |        |             |             |
| )            |              | Hospital  |        |             |             |
+--------------+--------------+-----------+--------+-------------+-------------+
 
 
 
+-------------------+
|  Result panel 12  |
+-------------------+
 
 
 
+--------------+--------------+-----------+------+-------------+-------------+
|              |  2023  |  [facility]  |  72  |  (missing)  |  (missing)  |
| (unavailable | 21::07     | [facility]   |      |             |             |
| )            |              | Hospital  |      |             |             |
+--------------+--------------+-----------+------+-------------+-------------+
 
 
 
 
+-------------------+
|  Result panel 13  |
+-------------------+
 
 
 
+--------------+--------------+-----------+---------+-------------+-------------+
|              |  2023  |  [facility]  |  17.44  |  (missing)  |  (missing)  |
| (unavailable | 21::07     | [facility]   |         |             |             |
| )            |              | Hospital  |         |             |             |
+--------------+--------------+-----------+---------+-------------+-------------+
 
 
 
+-------------------+
|  Result panel 14  |
+-------------------+
 
 
 
+--------------+--------------+-----------+-------+-------------+-------------+
|              |  2023  |  [facility]  |  138  |  (missing)  |  (missing)  |
| (unavailable | 21:01:07     | [facility]   |       |             |             |
| )            |              | Hospital  |       |             |             |
+--------------+--------------+-----------+-------+-------------+-------------+
 
 
 
+-------------------+
|  Result panel 15  |
+-------------------+
 
 
 
+--------------+--------------+-----------+-------+-------------+-------------+
|              |  2023  |  [facility]  |  3.7  |  (missing)  |  (missing)  |
| (unavailable | 21::07     | [facility]   |       |             |             |
| )            |              | Hospital  |       |             |             |
+--------------+--------------+-----------+-------+-------------+-------------+
 
 
 
+-------------------+
|  Result panel 16  |
+-------------------+
 
 
 
+--------------+--------------+-----------+-------+-------------+-------------+
|              |  2023  |  [facility]  |  102  |  (missing)  |  (missing)  |
| (unavailable | 21::07     | [facility]   |       |             |             |
| )            |              | Hospital  |       |             |             |
+--------------+--------------+-----------+-------+-------------+-------------+
 
 
 
+-------------------+
|  Result panel 17  |
+-------------------+
 
 
 
 
+--------------+--------------+-----------+------+-------------+-------------+
|              |  2023  |  [facility]  |  27  |  (missing)  |  (missing)  |
| (unavailable | 21:01:07     | [facility]   |      |             |             |
| )            |              | Hospital  |      |             |             |
+--------------+--------------+-----------+------+-------------+-------------+
 
 
 
+-------------------+
|  Result panel 18  |
+-------------------+
 
 
 
+--------------+--------------+-----------+--------+-------------+-------------+
|              |  2023  |  [facility]  |  12.7  |  (missing)  |  (missing)  |
| (unavailable | 21:01:07     | [facility]   |        |             |             |
| )            |              | Hospital  |        |             |             |
+--------------+--------------+-----------+--------+-------------+-------------+
 
 
 
+-------------------+
|  Result panel 19  |
+-------------------+
 
 
 
+--------------+--------------+-----------+-------+---------+-------------+
|              |  2023  |  [facility]  |  9.0  |  mg/dL  |  (missing)  |
| (unavailable | 21::07     | [facility]   |       |         |             |
| )            |              | Hospital  |       |         |             |
+--------------+--------------+-----------+-------+---------+-------------+
 
 
 
+-------------------+
|  Result panel 20  |
+-------------------+
 
 
 
+--------------+--------------+-----------+-------+---------+-------------+
|              |  2023  |  [facility]  |  2.1  |  mg/dL  |  (missing)  |
| (unavailable | 21::07     | [facility]   |       |         |             |
| )            |              | Hospital  |       |         |             |
+--------------+--------------+-----------+-------+---------+-------------+
 
 
 
+-------------------+
|  Result panel 21  |
+-------------------+
 
 
 
+--------------+--------------+-----------+-------+-------------+-------------+
|              |  2023  |  [facility]  |  8.0  |  (missing)  |  (missing)  |
 
| (unavailable | 21::07     | [facility]   |       |             |             |
| )            |              | Hospital  |       |             |             |
+--------------+--------------+-----------+-------+-------------+-------------+
 
 
 
+-------------------+
|  Result panel 22  |
+-------------------+
 
 
 
+--------------+--------------+-----------+--------+-------------+-------------+
|              |  2023  |  [facility]  |  40.4  |  (missing)  |  (missing)  |
| (unavailable | 21:01:07     | [facility]   |        |             |             |
| )            |              | Hospital  |        |             |             |
+--------------+--------------+-----------+--------+-------------+-------------+
 
 
 
+-------------------+
|  Result panel 23  |
+-------------------+
 
 
 
+--------------+--------------+-----------+-------+-------------+-------------+
|              |  2023  |  [facility]  |  3.7  |  (missing)  |  (missing)  |
| (unavailable | 21::07     | [facility]   |       |             |             |
| )            |              | Hospital  |       |             |             |
+--------------+--------------+-----------+-------+-------------+-------------+
 
 
 
+-------------------+
|  Result panel 24  |
+-------------------+
 
 
 
+--------------+--------------+-----------+-------+-------------+-------------+
|              |  2023  |  [facility]  |  4.3  |  (missing)  |  (missing)  |
| (unavailable | :07     | [facility]   |       |             |             |
| )            |              | Hospital  |       |             |             |
+--------------+--------------+-----------+-------+-------------+-------------+
 
 
 
+-------------------+
|  Result panel 25  |
+-------------------+
 
 
 
+--------------+--------------+-----------+--------+-------------+-------------+
|              |  2023  |  [facility]  |  0.86  |  (missing)  |  (missing)  |
| (unavailable | 21:01:07     | [facility]   |        |             |             |
| )            |              | Hospital  |        |             |             |
+--------------+--------------+-----------+--------+-------------+-------------+
 
 
 
 
+-------------------+
|  Result panel 26  |
+-------------------+
 
 
 
+--------------+--------------+-----------+-------+-------------+-------------+
|              |  2023  |  [facility]  |  0.4  |  (missing)  |  (missing)  |
| (unavailable | 21:01:07     | [facility]   |       |             |             |
| )            |              | Hospital  |       |             |             |
+--------------+--------------+-----------+-------+-------------+-------------+
 
 
 
+-------------------+
|  Result panel 27  |
+-------------------+
 
 
 
+--------------+--------------+-----------+------+-------------+-------------+
|              |  2023  |  [facility]  |  20  |  (missing)  |  (missing)  |
| (unavailable | 21:01:07     | [facility]   |      |             |             |
| )            |              | Hospital  |      |             |             |
+--------------+--------------+-----------+------+-------------+-------------+
 
 
 
+-------------------+
|  Result panel 28  |
+-------------------+
 
 
 
+--------------+--------------+-----------+------+-------------+-------------+
|              |  2023  |  [facility]  |  31  |  (missing)  |  (missing)  |
| (unavailable | 21:01:07     | [facility]   |      |             |             |
| )            |              | Hospital  |      |             |             |
+--------------+--------------+-----------+------+-------------+-------------+
 
 
 
+-------------------+
|  Result panel 29  |
+-------------------+
 
 
 
+--------------+--------------+-----------+-------+-------------+-------------+
|              |  2023  |  [facility]  |  123  |  (missing)  |  (missing)  |
| (unavailable | 21:01:07     | [facility]   |       |             |             |
| )            |              | Hospital  |       |             |             |
+--------------+--------------+-----------+-------+-------------+-------------+
 
 
 
+-------------------+
|  Result panel 30  |
 
+-------------------+
 
 
 
+--------------+--------------+-----------+-------+-------------+-------------+
|              |  2023  |  [facility]  |  4.0  |  (missing)  |  (missing)  |
| (unavailable | 21:01:07     | [facility]   |       |             |             |
| )            |              | Hospital  |       |             |             |
+--------------+--------------+-----------+-------+-------------+-------------+
 
 
 
+-------------------+
|  Result panel 31  |
+-------------------+
 
 
 
+--------------+--------------+-----------+--------+-------------+-------------+
|              |  2023  |  [facility]  |  88.3  |  (missing)  |  (missing)  |
| (unavailable | 21:01:07     | [facility]   |        |             |             |
| )            |              | Hospital  |        |             |             |
+--------------+--------------+-----------+--------+-------------+-------------+
 
 
 
+-------------------+
|  Result panel 32  |
+-------------------+
 
 
 
+--------------+--------------+-----------+--------+-------------+-------------+
|              |  2023  |  [facility]  |  28.9  |  (missing)  |  (missing)  |
| (unavailable | 21:01:07     | [facility]   |        |             |             |
| )            |              | Hospital  |        |             |             |
+--------------+--------------+-----------+--------+-------------+-------------+
 
 
 
+-------------------+
|  Result panel 33  |
+-------------------+
 
 
 
+--------------+--------------+-----------+--------+-------------+-------------+
|              |  2023  |  [facility]  |  32.8  |  (missing)  |  (missing)  |
| (unavailable | 21::07     | [facility]   |        |             |             |
| )            |              | Hospital  |        |             |             |
+--------------+--------------+-----------+--------+-------------+-------------+
 
 
 
+-------------------+
|  Result panel 34  |
+-------------------+
 
 
 
 
+--------------+--------------+-----------+--------+-------------+-------------+
|              |  2023  |  [facility]  |  15.0  |  (missing)  |  (missing)  |
| (unavailable | 21::07     | [facility]   |        |             |             |
| )            |              | Hospital  |        |             |             |
+--------------+--------------+-----------+--------+-------------+-------------+
 
 
 
+-------------------+
|  Result panel 35  |
+-------------------+
 
 
 
+--------------+--------------+-----------+-------+-------------+-------------+
|              |  2023  |  [facility]  |  165  |  (missing)  |  (missing)  |
| (unavailable | 21:01:07     | [facility]   |       |             |             |
| )            |              | Hospital  |       |             |             |
+--------------+--------------+-----------+-------+-------------+-------------+
 
 
 
+-------------------+
|  Result panel 36  |
+-------------------+
 
 
 
+--------------+--------------+-----------+------------+-------------+-------------+
|              |  2023  |  [facility]  |  POSITIVE  |  (missing)  |  (missing)  |
| (unavailable | 21:50:07     | [facility]   |            |             |             |
| )            |              | Hospital  |            |             |             |
+--------------+--------------+-----------+------------+-------------+-------------+
 
 
 
+-------------------+
|  Result panel 37  |
+-------------------+
 
 
 
+--------------+--------------+-----------+------------+-------------+-------------+
|              |  2023  |  [facility]  |  NEGATIVE  |  (missing)  |  (missing)  |
| (unavailable | 21:50:07     | [facility]   |            |             |             |
| )            |              | Hospital  |            |             |             |
+--------------+--------------+-----------+------------+-------------+-------------+
 
 
 
+-------------------+
|  Result panel 38  |
+-------------------+
 
 
 
+--------------+--------------+-----------+------------+-------------+-------------+
|              |  2023  |  [facility]  |  NEGATIVE  |  (missing)  |  (missing)  |
| (unavailable | 21:50:07     | [facility]   |            |             |             |
| )            |              | Hospital  |            |             |             |
 
+--------------+--------------+-----------+------------+-------------+-------------+
 
 
 
+-------------------+
|  Result panel 39  |
+-------------------+
 
 
 
+--------------+--------------+-----------+------------+-------------+-------------+
|              |  2023  |  [facility]  |  NEGATIVE  |  (missing)  |  (missing)  |
| (unavailable | 21:50:07     | [facility]   |            |             |             |
| )            |              | Hospital  |            |             |             |
+--------------+--------------+-----------+------------+-------------+-------------+
 
 
 
+-------------------+
|  Result panel 40  |
+-------------------+
 
 
 
+--------------+--------------+-----------+----------+-------------+-------------+
|              |  2023  |  [facility]  |  YELLOW  |  (missing)  |  (missing)  |
| (unavailable | 22:55:07     | [facility]   |          |             |             |
| )            |              | Hospital  |          |             |             |
+--------------+--------------+-----------+----------+-------------+-------------+
 
 
 
+-------------------+
|  Result panel 41  |
+-------------------+
 
 
 
+--------------+--------------+-----------+---------+-------------+-------------+
|              |  2023  |  [facility]  |  CLEAR  |  (missing)  |  (missing)  |
| (unavailable | 22:55:07     | [facility]   |         |             |             |
| )            |              | Hospital  |         |             |             |
+--------------+--------------+-----------+---------+-------------+-------------+
 
 
 
+-------------------+
|  Result panel 42  |
+-------------------+
 
 
 
+--------------+--------------+-----------+------------+-------------+-------------+
|              |  2023  |  [facility]  |  NEGATIVE  |  (missing)  |  (missing)  |
| (unavailable | 22:55:07     | [facility]   |            |             |             |
| )            |              | Hospital  |            |             |             |
+--------------+--------------+-----------+------------+-------------+-------------+
 
 
 
 
+-------------------+
|  Result panel 43  |
+-------------------+
 
 
 
+--------------+--------------+-----------+------------+-------------+-------------+
|              |  2023  |  [facility]  |  NEGATIVE  |  (missing)  |  (missing)  |
| (unavailable | 22:55:07     | [facility]   |            |             |             |
| )            |              | Hospital  |            |             |             |
+--------------+--------------+-----------+------------+-------------+-------------+
 
 
 
+-------------------+
|  Result panel 44  |
+-------------------+
 
 
 
+--------------+--------------+-----------+---------+-------------+-------------+
|              |  2023  |  [facility]  |  TRACE  |  (missing)  |  (missing)  |
| (unavailable | 22:55:07     | [facility]   |         |             |             |
| )            |              | Hospital  |         |             |             |
+--------------+--------------+-----------+---------+-------------+-------------+
 
 
 
+-------------------+
|  Result panel 45  |
+-------------------+
 
 
 
+--------------+--------------+-----------+---------+-------------+-------------+
|              |  2023  |  [facility]  |  1.025  |  (missing)  |  (missing)  |
| (unavailable | 22:55:07     | [facility]   |         |             |             |
| )            |              | Hospital  |         |             |             |
+--------------+--------------+-----------+---------+-------------+-------------+
 
 
 
+-------------------+
|  Result panel 46  |
+-------------------+
 
 
 
+--------------+--------------+-----------+------------+-------------+-------------+
|              |  2023  |  [facility]  |  NEGATIVE  |  (missing)  |  (missing)  |
| (unavailable | 22:55:07     | [facility]   |            |             |             |
| )            |              | Hospital  |            |             |             |
+--------------+--------------+-----------+------------+-------------+-------------+
 
 
 
+-------------------+
|  Result panel 47  |
+-------------------+
 
 
 
 
+--------------+--------------+-----------+-------+-------------+-------------+
|              |  2023  |  [facility]  |  6.0  |  (missing)  |  (missing)  |
| (unavailable | :55:07     | [facility]   |       |             |             |
| )            |              | Hospital  |       |             |             |
+--------------+--------------+-----------+-------+-------------+-------------+
 
 
 
+-------------------+
|  Result panel 48  |
+-------------------+
 
 
 
+--------------+--------------+-----------+------------+-------------+-------------+
|              |  2023  |  [facility]  |  NEGATIVE  |  (missing)  |  (missing)  |
| (unavailable | 22:55:07     | [facility]   |            |             |             |
| )            |              | Hospital  |            |             |             |
+--------------+--------------+-----------+------------+-------------+-------------+
 
 
 
+-------------------+
|  Result panel 49  |
+-------------------+
 
 
 
+--------------+--------------+-----------+----------+-------------+-------------+
|              |  2023  |  [facility]  |  NORMAL  |  (missing)  |  (missing)  |
| (unavailable | 22:55:07     | [facility]   |          |             |             |
| )            |              | Hospital  |          |             |             |
+--------------+--------------+-----------+----------+-------------+-------------+
 
 
 
+-------------------+
|  Result panel 50  |
+-------------------+
 
 
 
+--------------+--------------+-----------+------------+-------------+-------------+
|              |  2023  |  [facility]  |  NEGATIVE  |  (missing)  |  (missing)  |
| (unavailable | 22:55:07     | [facility]   |            |             |             |
| )            |              | Hospital  |            |             |             |
+--------------+--------------+-----------+------------+-------------+-------------+
 
 
 
+-------------------+
|  Result panel 51  |
+-------------------+
 
 
 
+--------------+--------------+-----------+------------+-------------+-------------+
|              |  2023  |  [facility]  |  NEGATIVE  |  (missing)  |  (missing)  |
 
| (unavailable | 22:55:07     | [facility]   |            |             |             |
| )            |              | Hospital  |            |             |             |
+--------------+--------------+-----------+------------+-------------+-------------+
 
 
 
 Social History
 
 
+--------------------+----------------+-----------------------------+
|  date              |  description   |  facility                   |
+--------------------+----------------+-----------------------------+
|  2020-12-10 00:00  |  Never smoker  |  [facility]  |
|                    |                | [facility]                        |
+--------------------+----------------+-----------------------------+
 
 
 
 Vital Signs
 
 
+--------------------+---------------------+----------+---------+
|  date              |  measurement        |  value   |  units  |
+--------------------+---------------------+----------+---------+
|  2023 00:00  |  BMI                |  26.6    |  kg/m2  |
+--------------------+---------------------+----------+---------+
|  2023 00:00  |  height_metric      |  165.1   |  cm     |
+--------------------+---------------------+----------+---------+
|  2023 00:00  |  height_standard    |  65      |  in     |
+--------------------+---------------------+----------+---------+
|  2023 00:00  |  weight_metric      |  72.57   |  kg     |
+--------------------+---------------------+----------+---------+
|  2023 00:00  |  weight_standard    |  159.99  |  lb     |
+--------------------+---------------------+----------+---------+
|  2023 00:00  |  weight_standard    |  160     |  lb     |
+--------------------+---------------------+----------+---------+
|  2023 00:00  |  BP_diastolic       |  55      |  mmHg   |
+--------------------+---------------------+----------+---------+
|  2023 00:00  |  BP_systolic        |  128     |  mmHg   |
+--------------------+---------------------+----------+---------+
|  2023 00:00  |  heart_rate         |  89      |  /min   |
+--------------------+---------------------+----------+---------+
|  2023 00:00  |  o2_saturation      |  97      |  %      |
+--------------------+---------------------+----------+---------+
|  2023 00:00  |  respiration_rate   |  18      |  /min   |
+--------------------+---------------------+----------+---------+
|  2023 00:00  |  temperature_metric |  36.89   |  C      |
|                    |                     |          |         |
+--------------------+---------------------+----------+---------+
|  2023 00:00  |                     |  98.4    |  F      |
|                    | temperature_standar |          |         |
|                    | d                   |          |         |
+--------------------+---------------------+----------+---------+

## 2023-07-14 NOTE — XMS
Continuity of Care Document
  Created on: 2023
 
 KARIN DOAN
 External Reference #: [id number]
 : 52
 Sex: Female
 
 Demographics
 
 
+-----------------------+------------------------+
| Address               | [address]        |
|                       | [place]  [id number]   |
+-----------------------+------------------------+
| Preferred Language    | Unknown                |
+-----------------------+------------------------+
| Marital Status        |                 |
+-----------------------+------------------------+
| Uatsdin Affiliation | Unknown                |
+-----------------------+------------------------+
| Race                  | White                  |
+-----------------------+------------------------+
| Ethnic Group          | Not  or  |
+-----------------------+------------------------+
 
 
 Author
 
 
+--------------+--------------------------+
| Author       | [facility]                 |
+--------------+--------------------------+
| Organization | [facility]                 |
+--------------+--------------------------+
| Address      | [address] |
|              | [place]  [address]     |
+--------------+--------------------------+
| Phone        | [phone]          |
+--------------+--------------------------+
 
 
 
 Care Team Providers
 
 
+-----------------------+-------------+-------------+
| Care Team Member Name | Role        | Phone       |
+-----------------------+-------------+-------------+
 Unavailable | Unavailable |
+-----------------------+-------------+-------------+
 Unavailable | Unavailable |
+-----------------------+-------------+-------------+
 
 
 
 Allergies and Intolerances
 
 
 
+-------------+---------------+----------------+-----------------+-----------------+
|  date       |  description  |  facility      |  reaction       |  severity       |
+-------------+---------------+----------------+-----------------+-----------------+
|  (no date)  |  Hives        |  [facility]          |  (no reaction)  |  (no severity)  |
|             |               | [facility]    |                 |                 |
|             |               | [facility]  |                 |                 |
+-------------+---------------+----------------+-----------------+-----------------+
|  (no date)  |  Urticaria    |  [facility]       |  (no reaction)  |  (no severity)  |
|             |               | [facility]        |                 |                 |
|             |               | Hospital       |                 |                 |
+-------------+---------------+----------------+-----------------+-----------------+
|  (no date)  |  Penicillin   |  [facility]       |  (no reaction)  |  (no severity)  |
|             |               | [facility]        |                 |                 |
|             |               | Hospital       |                 |                 |
+-------------+---------------+----------------+-----------------+-----------------+
|  (no date)  |  Penicillin   |  [facility]       |  (no reaction)  |  (no severity)  |
|             |               | [facility]        |                 |                 |
|             |               | Hospital       |                 |                 |
+-------------+---------------+----------------+-----------------+-----------------+
|  (no date)  |  Penicillins  |  [facility]           |  (no reaction)  |  (no severity)  |
+-------------+---------------+----------------+-----------------+-----------------+
|  (no date)  |  PENICILLINS  |  [facility]          |  (no reaction)  |  (no severity)  |
|             |               | [facility]    |                 |                 |
|             |               | [facility]  |                 |                 |
+-------------+---------------+----------------+-----------------+-----------------+
|  (no date)  |  Penicillin   |  [facility]       |  (no reaction)  |  (no severity)  |
|             |               | [facility]        |                 |                 |
|             |               | Hospital       |                 |                 |
+-------------+---------------+----------------+-----------------+-----------------+
|  (no date)  |  Penicillin   |  [facility]       |  (no reaction)  |  (no severity)  |
|             |               | [facility]        |                 |                 |
|             |               | Hospital       |                 |                 |
+-------------+---------------+----------------+-----------------+-----------------+
 
 
 
 Encounters
 No information.
 
 Functional Status
 No information.
 
 Immunizations
 
 
+--------------------+---------------+----------------------------+
|  date              |  description  |  facility                  |
+--------------------+---------------+----------------------------+
|  2017 00:00  |  Tdap         |  [facility]  |
+--------------------+---------------+----------------------------+
 
 
 
 Medications
 
 
+--------------------+-----------------------------+-----------------------------+
|  date              |  description                |  facility                   |
+--------------------+-----------------------------+-----------------------------+
 
|  2023 00:00  |  ONDANSETRON                |  [facility]   |
+--------------------+-----------------------------+-----------------------------+
|  2021 00:00  |  estradiol 0.5 mg oral      |  [facility]  |
|                    | tablet                      | [facility]                        |
+--------------------+-----------------------------+-----------------------------+
|  2021 00:00  |  estradiol 0.5 mg oral      |  [facility]  |
|                    | tablet                      | [facility]                        |
+--------------------+-----------------------------+-----------------------------+
|  2023 00:00  |  ESTRADIOL                  |  [facility]   |
+--------------------+-----------------------------+-----------------------------+
|  2021 00:00  |  omeprazole 20 mg (as       |  [facility]  |
|                    | omeprazole magnesium 20.6   | [facility]                        |
|                    | mg) delayed release oral    |                             |
|                    | capsule                     |                             |
+--------------------+-----------------------------+-----------------------------+
|  2021 00:00  |  omeprazole 20 mg (as       |  [facility]  |
|                    | omeprazole magnesium 20.6   | [facility]                        |
|                    | mg) delayed release oral    |                             |
|                    | capsule                     |                             |
+--------------------+-----------------------------+-----------------------------+
|  2023 00:00  |  SPIRONOLACTONE             |  [facility]   |
+--------------------+-----------------------------+-----------------------------+
|  2023 00:00  |  MONTELUKAST SODIUM         |  [facility]   |
+--------------------+-----------------------------+-----------------------------+
|  2023 00:00  |  OMEPRAZOLE                 |  [facility]   |
+--------------------+-----------------------------+-----------------------------+
|  2023 00:00  |                             |  [facility]   |
|                    | Fluticasone/Umeclidin/Vilan |                             |
|                    | ter                         |                             |
+--------------------+-----------------------------+-----------------------------+
|  2023 00:00  |  Nirmatrelvir/Ritonavir     |  [facility]   |
+--------------------+-----------------------------+-----------------------------+
|  2023 00:00  |  FAMOTIDINE                 |  [facility]   |
+--------------------+-----------------------------+-----------------------------+
|  2023 00:00  |  GABAPENTIN                 |  [facility]   |
+--------------------+-----------------------------+-----------------------------+
|  2018 00:00  |  ONDANSETRON                |  [facility]   |
+--------------------+-----------------------------+-----------------------------+
|  2020 00:00  |  predniSONE                 |  [facility]   |
+--------------------+-----------------------------+-----------------------------+
|  2023 00:00  |  SUMATRIPTAN SUCCINATE      |  [facility]   |
+--------------------+-----------------------------+-----------------------------+
|  2021 00:00  |  sumatriptan 50 mg oral     |  [facility]  |
|                    | tablet                      | [facility]                        |
+--------------------+-----------------------------+-----------------------------+
|  2021 00:00  |  sumatriptan 50 mg oral     |  [facility]  |
|                    | tablet                      | [facility]                        |
+--------------------+-----------------------------+-----------------------------+
|  2023 00:00  |  VALACYCLOVIR HCL           |  [facility]   |
+--------------------+-----------------------------+-----------------------------+
|  2020 00:00  |  AMOXICILLIN/POTASSIUM CLAV |  [facility]   |
|                    |                             |                             |
+--------------------+-----------------------------+-----------------------------+
|  2023 00:00  |  TRAZODONE HCL              |  [facility]   |
+--------------------+-----------------------------+-----------------------------+
|  2021 00:00  |  trazodone hydrochloride 50 |  [facility]  |
|                    |  mg oral tablet             | [facility]                        |
+--------------------+-----------------------------+-----------------------------+
|  2021 00:00  |  trazodone hydrochloride 50 |  [facility]  |
|                    |  mg oral tablet             | [facility]                        |
 
+--------------------+-----------------------------+-----------------------------+
|  2021 00:00  |  amitriptyline              |  [facility]  |
|                    | hydrochloride 25 mg oral    | [facility]                        |
|                    | tablet                      |                             |
+--------------------+-----------------------------+-----------------------------+
|  2021 00:00  |  amitriptyline              |  [facility]  |
|                    | hydrochloride 25 mg oral    | [facility]                        |
|                    | tablet                      |                             |
+--------------------+-----------------------------+-----------------------------+
|  2023 00:00  |  HYDROCODONE                |  [facility]   |
|                    | BIT/ACETAMINOPHEN           |                             |
+--------------------+-----------------------------+-----------------------------+
|  2023 00:00  |  ALBUTEROL SULFATE          |  [facility]   |
+--------------------+-----------------------------+-----------------------------+
|  2020 00:00  |  Codeine                    |  [facility]   |
|                    | Phosphate/Guaifenesin       |                             |
+--------------------+-----------------------------+-----------------------------+
 
 
 
 Problems
 
 
+--------------------+-----------------------------+-----------------------------+
|  date              |  description                |  facility                   |
+--------------------+-----------------------------+-----------------------------+
|  2013 00:00  |  lumbar radiculopathy       |  [facility]  |
|                    | (disorder)                  | [facility]                        |
+--------------------+-----------------------------+-----------------------------+
|  2013 00:00  |  degeneration of lumbar     |  [facility]  |
|                    | intervertebral disc         | [facility]                        |
|                    | (disorder)                  |                             |
+--------------------+-----------------------------+-----------------------------+
|  2013 00:00  |  bursitis of hip (disorder) |  [facility]  |
|                    |                             | [facility]                        |
+--------------------+-----------------------------+-----------------------------+
|  2013 00:00  |  DDD (degenerative disc     |  [facility]  |
|                    | disease), lumbar            | [facility]                        |
+--------------------+-----------------------------+-----------------------------+
|  2013 00:00  |  Lumbar radiculopathy       |  [facility]  |
|                    |                             | [facility]                        |
+--------------------+-----------------------------+-----------------------------+
|  2013 00:00  |  Trochanteric bursitis      |  [facility]  |
|                    |                             | [facility]                        |
+--------------------+-----------------------------+-----------------------------+
|  2013 00:00  |  sacroiliac joint inflamed  |  [facility]  |
|                    |                             | [facility]                        |
+--------------------+-----------------------------+-----------------------------+
|  2013 00:00  |  Sacroiliitis               |  [facility]  |
|                    |                             | [facility]                        |
+--------------------+-----------------------------+-----------------------------+
|  2015 00:00  |  lateral epicondylitis of   |  [facility]  |
|                    | left humerus (disorder)     | [facility]                        |
+--------------------+-----------------------------+-----------------------------+
|  2015 00:00  |  Lateral epicondylitis of   |  [facility]  |
|                    | left elbow                  | [facility]                        |
+--------------------+-----------------------------+-----------------------------+
|  2016 00:00  |  chronic diarrhea           |  [facility]  |
|                    | (disorder)                  | [facility]                        |
+--------------------+-----------------------------+-----------------------------+
 
|  2016 00:00  |  drug therapy status        |  [facility]  |
|                    | (situation)                 | [facility]                        |
+--------------------+-----------------------------+-----------------------------+
|  2016 00:00  |  Chronic diarrhea           |  [facility]  |
|                    |                             | [facility]                        |
+--------------------+-----------------------------+-----------------------------+
|  2016 00:00  |  Hormone replacement        |  [facility]  |
|                    | therapy (HRT)               | [facility]                        |
+--------------------+-----------------------------+-----------------------------+
|  2016-10-10 00:00  |  collagenous colitis        |  [facility]  |
|                    | (disorder)                  | [facility]                        |
+--------------------+-----------------------------+-----------------------------+
|  2016-10-10 00:00  |  Collagenous colitis        |  [facility]  |
|                    |                             | [facility]                        |
+--------------------+-----------------------------+-----------------------------+
|  2017 00:00  |  Laceration of scalp        |  [facility]   |
+--------------------+-----------------------------+-----------------------------+
|  2017 00:00  |  Traumatic injury of head   |  [facility]   |
+--------------------+-----------------------------+-----------------------------+
|  2018 00:00  |  Nonspecific abdominal pain |  [facility]   |
|                    |                             |                             |
+--------------------+-----------------------------+-----------------------------+
|  2018 00:00  |  Nausea and vomiting        |  [facility]   |
+--------------------+-----------------------------+-----------------------------+
|  2018 00:00  |  Headache                   |  [facility]   |
+--------------------+-----------------------------+-----------------------------+
|  2020 00:00  |  migraine (disorder)        |  [facility]  |
|                    |                             | [facility]                        |
+--------------------+-----------------------------+-----------------------------+
|  2020 00:00  |  chronic cough (finding)    |  [facility]  |
|                    |                             | [facility]                        |
+--------------------+-----------------------------+-----------------------------+
|  2020 00:00  |  Migraines                  |  [facility]  |
|                    |                             | [facility]                        |
+--------------------+-----------------------------+-----------------------------+
|  2020 00:00  |  Chronic cough              |  [facility]  |
|                    |                             | [facility]                        |
+--------------------+-----------------------------+-----------------------------+
|  2020 00:00  |  gastro-esophageal reflux   |  [facility]  |
|                    | disease                     | [facility]                        |
+--------------------+-----------------------------+-----------------------------+
|  2020 00:00  |  GERD (gastroesophageal     |  [facility]  |
|                    | reflux disease)             | [facility]                        |
+--------------------+-----------------------------+-----------------------------+
|  2020-10-13 00:00  |  chronic pansinusitis       |  [facility]  |
|                    | (disorder)                  | [facility]                        |
+--------------------+-----------------------------+-----------------------------+
|  2020-10-13 00:00  |  Chronic pansinusitis       |  [facility]  |
|                    |                             | [facility]                        |
+--------------------+-----------------------------+-----------------------------+
|  2020-10-20 00:00  |  deviated nasal septum      |  [facility]  |
|                    | (disorder)                  | [facility]                        |
+--------------------+-----------------------------+-----------------------------+
|  2020-10-20 00:00  |  hypertrophy of nasal       |  [facility]  |
|                    | turbinates (disorder)       | [facility]                        |
+--------------------+-----------------------------+-----------------------------+
|  2020-10-20 00:00  |  Deviated nasal septum      |  [facility]  |
|                    |                             | [facility]                        |
+--------------------+-----------------------------+-----------------------------+
|  2020-10-20 00:00  |  Hypertrophy of nasal       |  [facility]  |
 
|                    | turbinates                  | [facility]                        |
+--------------------+-----------------------------+-----------------------------+
|  2020 00:00  |  lumbar pain                |  [facility]  |
|                    |                             | [facility]                        |
+--------------------+-----------------------------+-----------------------------+
|  2020 00:00  |  moderate persistent asthma |  [facility]  |
|                    |  (disorder)                 | [facility]                        |
+--------------------+-----------------------------+-----------------------------+
|  2020 00:00  |  neck ache                  |  [facility]  |
|                    |                             | [facility]                        |
+--------------------+-----------------------------+-----------------------------+
|  2020 00:00  |  paresthesia (finding)      |  [facility]  |
|                    |                             | [facility]                        |
+--------------------+-----------------------------+-----------------------------+
|  2020 00:00  |  Asthma, moderate           |  [facility]  |
|                    | persistent                  | [facility]                        |
+--------------------+-----------------------------+-----------------------------+
|  2020 00:00  |  Neck pain                  |  [facility]  |
|                    |                             | [facility]                        |
+--------------------+-----------------------------+-----------------------------+
|  2020 00:00  |  Lumbar back pain           |  [facility]  |
|                    |                             | [facility]                        |
+--------------------+-----------------------------+-----------------------------+
|  2020 00:00  |  Paresthesia                |  [facility]  |
|                    |                             | [facility]                        |
+--------------------+-----------------------------+-----------------------------+
|  2023 09:09  |  ENCNTR SCREEN MAMMOGRAM    |  [facility]                        |
|                    | FOR MALIGNANT NEOPLASM OF   |                             |
|                    | BREAST                      |                             |
+--------------------+-----------------------------+-----------------------------+
|  2023 10:21  |  AGE-RELATED OSTEOPOROSIS   |  [facility]                        |
|                    | W/O CURRENT PAT             |                             |
+--------------------+-----------------------------+-----------------------------+
|  2023 10:21  |  ENCOUNTER FOR SCREENING    |  [facility]                        |
|                    | FOR OSTEOPOROSIS            |                             |
+--------------------+-----------------------------+-----------------------------+
|  2023 10:21  |  ASYMPTOMATIC MENOPAUSAL    |  [facility]                        |
|                    | STATE                       |                             |
+--------------------+-----------------------------+-----------------------------+
|  2023 00:00  |  Dehydration                |  [facility]   |
+--------------------+-----------------------------+-----------------------------+
|  2023 00:00  |  Infection due to severe    |  [facility]   |
|                    | acute respiratory syndrome  |                             |
|                    | coronavirus 2 (SARS-CoV-2)  |                             |
+--------------------+-----------------------------+-----------------------------+
|  2023 18:24  |  DEHYDRATION                |  [facility]                        |
+--------------------+-----------------------------+-----------------------------+
|  2023 18:24  |  WEAKNESS                   |  [facility]                        |
+--------------------+-----------------------------+-----------------------------+
|  2023 18:24  |  COVID-19                   |  [facility]                        |
+--------------------+-----------------------------+-----------------------------+
|  2023 18:24  |  OTHER LONG TERM (CURRENT)  |  [facility]                        |
|                    | DRUG THERAPY                |                             |
+--------------------+-----------------------------+-----------------------------+
|  2023 18:24  |  ALLERGY STATUS TO          |  [facility]                        |
|                    | PENICILLIN                  |                             |
+--------------------+-----------------------------+-----------------------------+
 
 
 
 
 Procedures
 No information.
 
 Results/Labs
 
 
+--------+--------+------------+---------+--------+---------+
|  test  |  date  |  facility  |  value  |  unit  |  notes  |
+--------+--------+------------+---------+--------+---------+
 
 
 
+------------------+
|  Result panel 1  |
+------------------+
 
 
 
+--------------+--------------+-----------+-------+-------------+-------------+
|              |  2023  |  [facility]  |  5.3  |  (missing)  |  (missing)  |
| (unavailable |      | [facility]   |       |             |             |
| )            |              | Hospital  |       |             |             |
+--------------+--------------+-----------+-------+-------------+-------------+
 
 
 
+------------------+
|  Result panel 2  |
+------------------+
 
 
 
+--------------+--------------+-----------+--------+-------------+-------------+
|              |  2023  |  [facility]  |  76.8  |  (missing)  |  (missing)  |
| (unavailable | ::     | [facility]   |        |             |             |
| )            |              | Hospital  |        |             |             |
+--------------+--------------+-----------+--------+-------------+-------------+
 
 
 
+------------------+
|  Result panel 3  |
+------------------+
 
 
 
+--------------+--------------+-----------+--------+-------------+-------------+
|              |  2023  |  [facility]  |  14.6  |  (missing)  |  (missing)  |
| (unavailable | 21::07     | [facility]   |        |             |             |
| )            |              | Hospital  |        |             |             |
+--------------+--------------+-----------+--------+-------------+-------------+
 
 
 
+------------------+
|  Result panel 4  |
+------------------+
 
 
 
 
+--------------+--------------+-----------+-------+-------------+-------------+
|              |  2023  |  [facility]  |  8.4  |  (missing)  |  (missing)  |
| (unavailable | 21::07     | [facility]   |       |             |             |
| )            |              | Hospital  |       |             |             |
+--------------+--------------+-----------+-------+-------------+-------------+
 
 
 
+------------------+
|  Result panel 5  |
+------------------+
 
 
 
+--------------+--------------+-----------+-------+-------------+-------------+
|              |  2023  |  [facility]  |  0.0  |  (missing)  |  (missing)  |
| (unavailable | 21::07     | [facility]   |       |             |             |
| )            |              | Hospital  |       |             |             |
+--------------+--------------+-----------+-------+-------------+-------------+
 
 
 
+------------------+
|  Result panel 6  |
+------------------+
 
 
 
+--------------+--------------+-----------+-------+-------------+-------------+
|              |  2023  |  [facility]  |  0.2  |  (missing)  |  (missing)  |
| (unavailable | 21:01:07     | [facility]   |       |             |             |
| )            |              | Hospital  |       |             |             |
+--------------+--------------+-----------+-------+-------------+-------------+
 
 
 
+------------------+
|  Result panel 7  |
+------------------+
 
 
 
+--------------+--------------+-----------+--------+-------------+-------------+
|              |  2023  |  [facility]  |  4.57  |  (missing)  |  (missing)  |
| (unavailable | 21:01:07     | [facility]   |        |             |             |
| )            |              | Hospital  |        |             |             |
+--------------+--------------+-----------+--------+-------------+-------------+
 
 
 
+------------------+
|  Result panel 8  |
+------------------+
 
 
 
+--------------+--------------+-----------+-------+---------+-------------+
|              |  2023  |  [facility]  |  139  |  mg/dL  |  (missing)  |
| (unavailable | 21:01:07     | [facility]   |       |         |             |
| )            |              | Hospital  |       |         |             |
 
+--------------+--------------+-----------+-------+---------+-------------+
 
 
 
+------------------+
|  Result panel 9  |
+------------------+
 
 
 
+--------------+--------------+-----------+------+---------+-------------+
|              |  2023  |  [facility]  |  15  |  mg/dL  |  (missing)  |
| (unavailable | 21:01:07     | [facility]   |      |         |             |
| )            |              | Hospital  |      |         |             |
+--------------+--------------+-----------+------+---------+-------------+
 
 
 
+-------------------+
|  Result panel 10  |
+-------------------+
 
 
 
+--------------+--------------+-----------+--------+---------+-------------+
|              |  2023  |  [facility]  |  0.86  |  mg/dL  |  (missing)  |
| (unavailable | 21:01:07     | [facility]   |        |         |             |
| )            |              | Hospital  |        |         |             |
+--------------+--------------+-----------+--------+---------+-------------+
 
 
 
+-------------------+
|  Result panel 11  |
+-------------------+
 
 
 
+--------------+--------------+-----------+--------+-------------+-------------+
|              |  2023  |  [facility]  |  13.2  |  (missing)  |  (missing)  |
| (unavailable | 21:01:07     | [facility]   |        |             |             |
| )            |              | Hospital  |        |             |             |
+--------------+--------------+-----------+--------+-------------+-------------+
 
 
 
+-------------------+
|  Result panel 12  |
+-------------------+
 
 
 
+--------------+--------------+-----------+------+-------------+-------------+
|              |  2023  |  [facility]  |  72  |  (missing)  |  (missing)  |
| (unavailable | 21::07     | [facility]   |      |             |             |
| )            |              | Hospital  |      |             |             |
+--------------+--------------+-----------+------+-------------+-------------+
 
 
 
 
+-------------------+
|  Result panel 13  |
+-------------------+
 
 
 
+--------------+--------------+-----------+---------+-------------+-------------+
|              |  2023  |  [facility]  |  17.44  |  (missing)  |  (missing)  |
| (unavailable | 21::07     | [facility]   |         |             |             |
| )            |              | Hospital  |         |             |             |
+--------------+--------------+-----------+---------+-------------+-------------+
 
 
 
+-------------------+
|  Result panel 14  |
+-------------------+
 
 
 
+--------------+--------------+-----------+-------+-------------+-------------+
|              |  2023  |  [facility]  |  138  |  (missing)  |  (missing)  |
| (unavailable | 21:01:07     | [facility]   |       |             |             |
| )            |              | Hospital  |       |             |             |
+--------------+--------------+-----------+-------+-------------+-------------+
 
 
 
+-------------------+
|  Result panel 15  |
+-------------------+
 
 
 
+--------------+--------------+-----------+-------+-------------+-------------+
|              |  2023  |  [facility]  |  3.7  |  (missing)  |  (missing)  |
| (unavailable | 21::07     | [facility]   |       |             |             |
| )            |              | Hospital  |       |             |             |
+--------------+--------------+-----------+-------+-------------+-------------+
 
 
 
+-------------------+
|  Result panel 16  |
+-------------------+
 
 
 
+--------------+--------------+-----------+-------+-------------+-------------+
|              |  2023  |  [facility]  |  102  |  (missing)  |  (missing)  |
| (unavailable | 21::07     | [facility]   |       |             |             |
| )            |              | Hospital  |       |             |             |
+--------------+--------------+-----------+-------+-------------+-------------+
 
 
 
+-------------------+
|  Result panel 17  |
+-------------------+
 
 
 
 
+--------------+--------------+-----------+------+-------------+-------------+
|              |  2023  |  [facility]  |  27  |  (missing)  |  (missing)  |
| (unavailable | 21:01:07     | [facility]   |      |             |             |
| )            |              | Hospital  |      |             |             |
+--------------+--------------+-----------+------+-------------+-------------+
 
 
 
+-------------------+
|  Result panel 18  |
+-------------------+
 
 
 
+--------------+--------------+-----------+--------+-------------+-------------+
|              |  2023  |  [facility]  |  12.7  |  (missing)  |  (missing)  |
| (unavailable | 21:01:07     | [facility]   |        |             |             |
| )            |              | Hospital  |        |             |             |
+--------------+--------------+-----------+--------+-------------+-------------+
 
 
 
+-------------------+
|  Result panel 19  |
+-------------------+
 
 
 
+--------------+--------------+-----------+-------+---------+-------------+
|              |  2023  |  [facility]  |  9.0  |  mg/dL  |  (missing)  |
| (unavailable | 21::07     | [facility]   |       |         |             |
| )            |              | Hospital  |       |         |             |
+--------------+--------------+-----------+-------+---------+-------------+
 
 
 
+-------------------+
|  Result panel 20  |
+-------------------+
 
 
 
+--------------+--------------+-----------+-------+---------+-------------+
|              |  2023  |  [facility]  |  2.1  |  mg/dL  |  (missing)  |
| (unavailable | 21::07     | [facility]   |       |         |             |
| )            |              | Hospital  |       |         |             |
+--------------+--------------+-----------+-------+---------+-------------+
 
 
 
+-------------------+
|  Result panel 21  |
+-------------------+
 
 
 
+--------------+--------------+-----------+-------+-------------+-------------+
|              |  2023  |  [facility]  |  8.0  |  (missing)  |  (missing)  |
 
| (unavailable | 21::07     | [facility]   |       |             |             |
| )            |              | Hospital  |       |             |             |
+--------------+--------------+-----------+-------+-------------+-------------+
 
 
 
+-------------------+
|  Result panel 22  |
+-------------------+
 
 
 
+--------------+--------------+-----------+--------+-------------+-------------+
|              |  2023  |  [facility]  |  40.4  |  (missing)  |  (missing)  |
| (unavailable | 21:01:07     | [facility]   |        |             |             |
| )            |              | Hospital  |        |             |             |
+--------------+--------------+-----------+--------+-------------+-------------+
 
 
 
+-------------------+
|  Result panel 23  |
+-------------------+
 
 
 
+--------------+--------------+-----------+-------+-------------+-------------+
|              |  2023  |  [facility]  |  3.7  |  (missing)  |  (missing)  |
| (unavailable | 21::07     | [facility]   |       |             |             |
| )            |              | Hospital  |       |             |             |
+--------------+--------------+-----------+-------+-------------+-------------+
 
 
 
+-------------------+
|  Result panel 24  |
+-------------------+
 
 
 
+--------------+--------------+-----------+-------+-------------+-------------+
|              |  2023  |  [facility]  |  4.3  |  (missing)  |  (missing)  |
| (unavailable | :07     | [facility]   |       |             |             |
| )            |              | Hospital  |       |             |             |
+--------------+--------------+-----------+-------+-------------+-------------+
 
 
 
+-------------------+
|  Result panel 25  |
+-------------------+
 
 
 
+--------------+--------------+-----------+--------+-------------+-------------+
|              |  2023  |  [facility]  |  0.86  |  (missing)  |  (missing)  |
| (unavailable | 21:01:07     | [facility]   |        |             |             |
| )            |              | Hospital  |        |             |             |
+--------------+--------------+-----------+--------+-------------+-------------+
 
 
 
 
+-------------------+
|  Result panel 26  |
+-------------------+
 
 
 
+--------------+--------------+-----------+-------+-------------+-------------+
|              |  2023  |  [facility]  |  0.4  |  (missing)  |  (missing)  |
| (unavailable | 21:01:07     | [facility]   |       |             |             |
| )            |              | Hospital  |       |             |             |
+--------------+--------------+-----------+-------+-------------+-------------+
 
 
 
+-------------------+
|  Result panel 27  |
+-------------------+
 
 
 
+--------------+--------------+-----------+------+-------------+-------------+
|              |  2023  |  [facility]  |  20  |  (missing)  |  (missing)  |
| (unavailable | 21:01:07     | [facility]   |      |             |             |
| )            |              | Hospital  |      |             |             |
+--------------+--------------+-----------+------+-------------+-------------+
 
 
 
+-------------------+
|  Result panel 28  |
+-------------------+
 
 
 
+--------------+--------------+-----------+------+-------------+-------------+
|              |  2023  |  [facility]  |  31  |  (missing)  |  (missing)  |
| (unavailable | 21:01:07     | [facility]   |      |             |             |
| )            |              | Hospital  |      |             |             |
+--------------+--------------+-----------+------+-------------+-------------+
 
 
 
+-------------------+
|  Result panel 29  |
+-------------------+
 
 
 
+--------------+--------------+-----------+-------+-------------+-------------+
|              |  2023  |  [facility]  |  123  |  (missing)  |  (missing)  |
| (unavailable | 21:01:07     | [facility]   |       |             |             |
| )            |              | Hospital  |       |             |             |
+--------------+--------------+-----------+-------+-------------+-------------+
 
 
 
+-------------------+
|  Result panel 30  |
 
+-------------------+
 
 
 
+--------------+--------------+-----------+-------+-------------+-------------+
|              |  2023  |  [facility]  |  4.0  |  (missing)  |  (missing)  |
| (unavailable | 21:01:07     | [facility]   |       |             |             |
| )            |              | Hospital  |       |             |             |
+--------------+--------------+-----------+-------+-------------+-------------+
 
 
 
+-------------------+
|  Result panel 31  |
+-------------------+
 
 
 
+--------------+--------------+-----------+--------+-------------+-------------+
|              |  2023  |  [facility]  |  88.3  |  (missing)  |  (missing)  |
| (unavailable | 21:01:07     | [facility]   |        |             |             |
| )            |              | Hospital  |        |             |             |
+--------------+--------------+-----------+--------+-------------+-------------+
 
 
 
+-------------------+
|  Result panel 32  |
+-------------------+
 
 
 
+--------------+--------------+-----------+--------+-------------+-------------+
|              |  2023  |  [facility]  |  28.9  |  (missing)  |  (missing)  |
| (unavailable | 21:01:07     | [facility]   |        |             |             |
| )            |              | Hospital  |        |             |             |
+--------------+--------------+-----------+--------+-------------+-------------+
 
 
 
+-------------------+
|  Result panel 33  |
+-------------------+
 
 
 
+--------------+--------------+-----------+--------+-------------+-------------+
|              |  2023  |  [facility]  |  32.8  |  (missing)  |  (missing)  |
| (unavailable | 21::07     | [facility]   |        |             |             |
| )            |              | Hospital  |        |             |             |
+--------------+--------------+-----------+--------+-------------+-------------+
 
 
 
+-------------------+
|  Result panel 34  |
+-------------------+
 
 
 
 
+--------------+--------------+-----------+--------+-------------+-------------+
|              |  2023  |  [facility]  |  15.0  |  (missing)  |  (missing)  |
| (unavailable | 21::07     | [facility]   |        |             |             |
| )            |              | Hospital  |        |             |             |
+--------------+--------------+-----------+--------+-------------+-------------+
 
 
 
+-------------------+
|  Result panel 35  |
+-------------------+
 
 
 
+--------------+--------------+-----------+-------+-------------+-------------+
|              |  2023  |  [facility]  |  165  |  (missing)  |  (missing)  |
| (unavailable | 21:01:07     | [facility]   |       |             |             |
| )            |              | Hospital  |       |             |             |
+--------------+--------------+-----------+-------+-------------+-------------+
 
 
 
+-------------------+
|  Result panel 36  |
+-------------------+
 
 
 
+--------------+--------------+-----------+------------+-------------+-------------+
|              |  2023  |  [facility]  |  POSITIVE  |  (missing)  |  (missing)  |
| (unavailable | 21:50:07     | [facility]   |            |             |             |
| )            |              | Hospital  |            |             |             |
+--------------+--------------+-----------+------------+-------------+-------------+
 
 
 
+-------------------+
|  Result panel 37  |
+-------------------+
 
 
 
+--------------+--------------+-----------+------------+-------------+-------------+
|              |  2023  |  [facility]  |  NEGATIVE  |  (missing)  |  (missing)  |
| (unavailable | 21:50:07     | [facility]   |            |             |             |
| )            |              | Hospital  |            |             |             |
+--------------+--------------+-----------+------------+-------------+-------------+
 
 
 
+-------------------+
|  Result panel 38  |
+-------------------+
 
 
 
+--------------+--------------+-----------+------------+-------------+-------------+
|              |  2023  |  [facility]  |  NEGATIVE  |  (missing)  |  (missing)  |
| (unavailable | 21:50:07     | [facility]   |            |             |             |
| )            |              | Hospital  |            |             |             |
 
+--------------+--------------+-----------+------------+-------------+-------------+
 
 
 
+-------------------+
|  Result panel 39  |
+-------------------+
 
 
 
+--------------+--------------+-----------+------------+-------------+-------------+
|              |  2023  |  [facility]  |  NEGATIVE  |  (missing)  |  (missing)  |
| (unavailable | 21:50:07     | [facility]   |            |             |             |
| )            |              | Hospital  |            |             |             |
+--------------+--------------+-----------+------------+-------------+-------------+
 
 
 
+-------------------+
|  Result panel 40  |
+-------------------+
 
 
 
+--------------+--------------+-----------+----------+-------------+-------------+
|              |  2023  |  [facility]  |  YELLOW  |  (missing)  |  (missing)  |
| (unavailable | 22:55:07     | [facility]   |          |             |             |
| )            |              | Hospital  |          |             |             |
+--------------+--------------+-----------+----------+-------------+-------------+
 
 
 
+-------------------+
|  Result panel 41  |
+-------------------+
 
 
 
+--------------+--------------+-----------+---------+-------------+-------------+
|              |  2023  |  [facility]  |  CLEAR  |  (missing)  |  (missing)  |
| (unavailable | 22:55:07     | [facility]   |         |             |             |
| )            |              | Hospital  |         |             |             |
+--------------+--------------+-----------+---------+-------------+-------------+
 
 
 
+-------------------+
|  Result panel 42  |
+-------------------+
 
 
 
+--------------+--------------+-----------+------------+-------------+-------------+
|              |  2023  |  [facility]  |  NEGATIVE  |  (missing)  |  (missing)  |
| (unavailable | 22:55:07     | [facility]   |            |             |             |
| )            |              | Hospital  |            |             |             |
+--------------+--------------+-----------+------------+-------------+-------------+
 
 
 
 
+-------------------+
|  Result panel 43  |
+-------------------+
 
 
 
+--------------+--------------+-----------+------------+-------------+-------------+
|              |  2023  |  [facility]  |  NEGATIVE  |  (missing)  |  (missing)  |
| (unavailable | 22:55:07     | [facility]   |            |             |             |
| )            |              | Hospital  |            |             |             |
+--------------+--------------+-----------+------------+-------------+-------------+
 
 
 
+-------------------+
|  Result panel 44  |
+-------------------+
 
 
 
+--------------+--------------+-----------+---------+-------------+-------------+
|              |  2023  |  [facility]  |  TRACE  |  (missing)  |  (missing)  |
| (unavailable | 22:55:07     | [facility]   |         |             |             |
| )            |              | Hospital  |         |             |             |
+--------------+--------------+-----------+---------+-------------+-------------+
 
 
 
+-------------------+
|  Result panel 45  |
+-------------------+
 
 
 
+--------------+--------------+-----------+---------+-------------+-------------+
|              |  2023  |  [facility]  |  1.025  |  (missing)  |  (missing)  |
| (unavailable | 22:55:07     | [facility]   |         |             |             |
| )            |              | Hospital  |         |             |             |
+--------------+--------------+-----------+---------+-------------+-------------+
 
 
 
+-------------------+
|  Result panel 46  |
+-------------------+
 
 
 
+--------------+--------------+-----------+------------+-------------+-------------+
|              |  2023  |  [facility]  |  NEGATIVE  |  (missing)  |  (missing)  |
| (unavailable | 22:55:07     | [facility]   |            |             |             |
| )            |              | Hospital  |            |             |             |
+--------------+--------------+-----------+------------+-------------+-------------+
 
 
 
+-------------------+
|  Result panel 47  |
+-------------------+
 
 
 
 
+--------------+--------------+-----------+-------+-------------+-------------+
|              |  2023  |  [facility]  |  6.0  |  (missing)  |  (missing)  |
| (unavailable | :55:07     | [facility]   |       |             |             |
| )            |              | Hospital  |       |             |             |
+--------------+--------------+-----------+-------+-------------+-------------+
 
 
 
+-------------------+
|  Result panel 48  |
+-------------------+
 
 
 
+--------------+--------------+-----------+------------+-------------+-------------+
|              |  2023  |  [facility]  |  NEGATIVE  |  (missing)  |  (missing)  |
| (unavailable | 22:55:07     | [facility]   |            |             |             |
| )            |              | Hospital  |            |             |             |
+--------------+--------------+-----------+------------+-------------+-------------+
 
 
 
+-------------------+
|  Result panel 49  |
+-------------------+
 
 
 
+--------------+--------------+-----------+----------+-------------+-------------+
|              |  2023  |  [facility]  |  NORMAL  |  (missing)  |  (missing)  |
| (unavailable | 22:55:07     | [facility]   |          |             |             |
| )            |              | Hospital  |          |             |             |
+--------------+--------------+-----------+----------+-------------+-------------+
 
 
 
+-------------------+
|  Result panel 50  |
+-------------------+
 
 
 
+--------------+--------------+-----------+------------+-------------+-------------+
|              |  2023  |  [facility]  |  NEGATIVE  |  (missing)  |  (missing)  |
| (unavailable | 22:55:07     | [facility]   |            |             |             |
| )            |              | Hospital  |            |             |             |
+--------------+--------------+-----------+------------+-------------+-------------+
 
 
 
+-------------------+
|  Result panel 51  |
+-------------------+
 
 
 
+--------------+--------------+-----------+------------+-------------+-------------+
|              |  2023  |  [facility]  |  NEGATIVE  |  (missing)  |  (missing)  |
 
| (unavailable | 22:55:07     | [facility]   |            |             |             |
| )            |              | [facility]  |            |             |             |
+--------------+--------------+-----------+------------+-------------+-------------+
 
 
 
 Social History
 
 
+--------------------+----------------+-----------------------------+
|  date              |  description   |  facility                   |
+--------------------+----------------+-----------------------------+
|  2020-12-10 00:00  |  Never smoker  |  [facility]  |
|                    |                | [facility]                        |
+--------------------+----------------+-----------------------------+
 
 
 
 Vital Signs
 
 
+--------------------+---------------------+----------+---------+
|  date              |  measurement        |  value   |  units  |
+--------------------+---------------------+----------+---------+
|  2023 00:00  |  BMI                |  26.6    |  kg/m2  |
+--------------------+---------------------+----------+---------+
|  2023 00:00  |  height_metric      |  165.1   |  cm     |
+--------------------+---------------------+----------+---------+
|  2023 00:00  |  height_standard    |  65      |  in     |
+--------------------+---------------------+----------+---------+
|  2023 00:00  |  weight_metric      |  72.57   |  kg     |
+--------------------+---------------------+----------+---------+
|  2023 00:00  |  weight_standard    |  159.99  |  lb     |
+--------------------+---------------------+----------+---------+
|  2023 00:00  |  weight_standard    |  160     |  lb     |
+--------------------+---------------------+----------+---------+
|  2023 00:00  |  BP_diastolic       |  55      |  mmHg   |
+--------------------+---------------------+----------+---------+
|  2023 00:00  |  BP_systolic        |  128     |  mmHg   |
+--------------------+---------------------+----------+---------+
|  2023 00:00  |  heart_rate         |  89      |  /min   |
+--------------------+---------------------+----------+---------+
|  2023 00:00  |  o2_saturation      |  97      |  %      |
+--------------------+---------------------+----------+---------+
|  2023 00:00  |  respiration_rate   |  18      |  /min   |
+--------------------+---------------------+----------+---------+
|  2023 00:00  |  temperature_metric |  36.89   |  C      |
|                    |                     |          |         |
+--------------------+---------------------+----------+---------+
|  2023 00:00  |                     |  98.4    |  F      |
|                    | temperature_standar |          |         |
|                    | d                   |          |         |
+--------------------+---------------------+----------+---------+

## 2023-07-16 NOTE — EKG
[facility]
                                    [address]
                                  [place], [place]  [address]
_________________________________________________________________________________________
                                                                 Signed   
 
 
Normal sinus rhythm
Normal ECG
When compared with ECG of 11-JUL-2023 20:45,
No significant change was found
Confirmed by ABDIRIZAK PEÑA MD ([id number]) on 7/16/2023 6:50:21 AM
 
 
 
 
 
 
 
 
 
 
 
 
 
 
 
 
 
 
 
 
 
 
 
 
 
 
 
 
 
 
 
 
 
 
 
 
 
 
 
 
    Electronically Signed By: ABDIRIZAK PEÑA  07/16/23 0650
_________________________________________________________________________________________
PATIENT NAME:     KARIN DOAN VIKRAM              
MEDICAL RECORD #: [id number]                     Electrocardiogram             
          ACCT #: [id number]  
DATE OF BIRTH:   [birth year]                                       
PHYSICIAN:   ABDIRIZAK PEÑA                       REPORT #: [id number]
REPORT IS CONFIDENTIAL AND NOT TO BE RELEASED WITHOUT AUTHORIZATION